# Patient Record
Sex: FEMALE | Race: WHITE | NOT HISPANIC OR LATINO
[De-identification: names, ages, dates, MRNs, and addresses within clinical notes are randomized per-mention and may not be internally consistent; named-entity substitution may affect disease eponyms.]

---

## 2017-06-01 ENCOUNTER — APPOINTMENT (OUTPATIENT)
Dept: CARDIOLOGY | Facility: CLINIC | Age: 67
End: 2017-06-01

## 2017-06-01 ENCOUNTER — NON-APPOINTMENT (OUTPATIENT)
Age: 67
End: 2017-06-01

## 2017-06-01 VITALS
HEIGHT: 61 IN | DIASTOLIC BLOOD PRESSURE: 95 MMHG | HEART RATE: 62 BPM | SYSTOLIC BLOOD PRESSURE: 170 MMHG | WEIGHT: 172 LBS | OXYGEN SATURATION: 95 % | BODY MASS INDEX: 32.47 KG/M2

## 2017-06-01 VITALS — DIASTOLIC BLOOD PRESSURE: 84 MMHG | SYSTOLIC BLOOD PRESSURE: 140 MMHG

## 2017-06-01 DIAGNOSIS — D18.03 HEMANGIOMA OF INTRA-ABDOMINAL STRUCTURES: ICD-10-CM

## 2017-06-01 DIAGNOSIS — Z82.49 FAMILY HISTORY OF ISCHEMIC HEART DISEASE AND OTHER DISEASES OF THE CIRCULATORY SYSTEM: ICD-10-CM

## 2017-06-01 DIAGNOSIS — H81.10 BENIGN PAROXYSMAL VERTIGO, UNSPECIFIED EAR: ICD-10-CM

## 2017-06-01 DIAGNOSIS — E04.1 NONTOXIC SINGLE THYROID NODULE: ICD-10-CM

## 2017-06-01 RX ORDER — MULTIVITAMIN
CAPSULE ORAL
Refills: 0 | Status: ACTIVE | COMMUNITY

## 2017-06-01 RX ORDER — METOPROLOL TARTRATE 25 MG/1
25 TABLET, FILM COATED ORAL TWICE DAILY
Qty: 180 | Refills: 3 | Status: DISCONTINUED | COMMUNITY
End: 2017-06-01

## 2017-06-01 RX ORDER — CETIRIZINE HCL 10 MG
10 TABLET ORAL
Refills: 0 | Status: ACTIVE | COMMUNITY

## 2017-06-05 ENCOUNTER — RESULT REVIEW (OUTPATIENT)
Age: 67
End: 2017-06-05

## 2017-07-19 ENCOUNTER — APPOINTMENT (OUTPATIENT)
Dept: CARDIOLOGY | Facility: CLINIC | Age: 67
End: 2017-07-19

## 2017-07-25 ENCOUNTER — APPOINTMENT (OUTPATIENT)
Dept: CARDIOLOGY | Facility: CLINIC | Age: 67
End: 2017-07-25

## 2017-09-11 ENCOUNTER — NON-APPOINTMENT (OUTPATIENT)
Age: 67
End: 2017-09-11

## 2017-09-11 ENCOUNTER — APPOINTMENT (OUTPATIENT)
Dept: CARDIOLOGY | Facility: CLINIC | Age: 67
End: 2017-09-11
Payer: MEDICARE

## 2017-09-11 VITALS
HEIGHT: 61 IN | HEART RATE: 78 BPM | WEIGHT: 171 LBS | OXYGEN SATURATION: 95 % | BODY MASS INDEX: 32.28 KG/M2 | SYSTOLIC BLOOD PRESSURE: 168 MMHG | DIASTOLIC BLOOD PRESSURE: 91 MMHG

## 2017-09-11 VITALS — DIASTOLIC BLOOD PRESSURE: 90 MMHG | SYSTOLIC BLOOD PRESSURE: 158 MMHG

## 2017-09-11 PROCEDURE — 99215 OFFICE O/P EST HI 40 MIN: CPT

## 2017-09-11 PROCEDURE — 93000 ELECTROCARDIOGRAM COMPLETE: CPT

## 2017-10-18 ENCOUNTER — APPOINTMENT (OUTPATIENT)
Dept: CARDIOLOGY | Facility: CLINIC | Age: 67
End: 2017-10-18
Payer: MEDICARE

## 2017-10-18 VITALS — DIASTOLIC BLOOD PRESSURE: 80 MMHG | SYSTOLIC BLOOD PRESSURE: 148 MMHG

## 2017-10-18 VITALS
OXYGEN SATURATION: 96 % | DIASTOLIC BLOOD PRESSURE: 77 MMHG | HEART RATE: 67 BPM | HEIGHT: 61 IN | SYSTOLIC BLOOD PRESSURE: 185 MMHG | BODY MASS INDEX: 31.53 KG/M2 | WEIGHT: 167 LBS

## 2017-10-18 LAB
ALBUMIN SERPL ELPH-MCNC: 4.5 G/DL
ALP BLD-CCNC: 86 U/L
ALT SERPL-CCNC: 20 U/L
ANION GAP SERPL CALC-SCNC: 14 MMOL/L
AST SERPL-CCNC: 28 U/L
BASOPHILS # BLD AUTO: 0.03 K/UL
BASOPHILS NFR BLD AUTO: 0.4 %
BILIRUB SERPL-MCNC: 0.4 MG/DL
BUN SERPL-MCNC: 15 MG/DL
CALCIUM SERPL-MCNC: 10.1 MG/DL
CHLORIDE SERPL-SCNC: 103 MMOL/L
CHOLEST SERPL-MCNC: 177 MG/DL
CHOLEST/HDLC SERPL: 3.2 RATIO
CO2 SERPL-SCNC: 25 MMOL/L
CREAT SERPL-MCNC: 0.79 MG/DL
EOSINOPHIL # BLD AUTO: 0.14 K/UL
EOSINOPHIL NFR BLD AUTO: 2.1 %
GLUCOSE SERPL-MCNC: 96 MG/DL
HCT VFR BLD CALC: 42.3 %
HDLC SERPL-MCNC: 56 MG/DL
HGB BLD-MCNC: 13.7 G/DL
IMM GRANULOCYTES NFR BLD AUTO: 0.1 %
LDLC SERPL CALC-MCNC: 89 MG/DL
LYMPHOCYTES # BLD AUTO: 2.35 K/UL
LYMPHOCYTES NFR BLD AUTO: 34.7 %
MAN DIFF?: NORMAL
MCHC RBC-ENTMCNC: 28.5 PG
MCHC RBC-ENTMCNC: 32.4 GM/DL
MCV RBC AUTO: 88.1 FL
MONOCYTES # BLD AUTO: 0.46 K/UL
MONOCYTES NFR BLD AUTO: 6.8 %
NEUTROPHILS # BLD AUTO: 3.79 K/UL
NEUTROPHILS NFR BLD AUTO: 55.9 %
PLATELET # BLD AUTO: 243 K/UL
POTASSIUM SERPL-SCNC: 4.9 MMOL/L
PROT SERPL-MCNC: 7.6 G/DL
RBC # BLD: 4.8 M/UL
RBC # FLD: 14.1 %
SODIUM SERPL-SCNC: 142 MMOL/L
TRIGL SERPL-MCNC: 158 MG/DL
WBC # FLD AUTO: 6.78 K/UL

## 2017-10-18 PROCEDURE — 99215 OFFICE O/P EST HI 40 MIN: CPT

## 2017-10-21 LAB — 25(OH)D3 SERPL-MCNC: 41.1 NG/ML

## 2017-11-04 ENCOUNTER — APPOINTMENT (OUTPATIENT)
Dept: INTERNAL MEDICINE | Facility: CLINIC | Age: 67
End: 2017-11-04
Payer: MEDICARE

## 2017-11-04 VITALS
OXYGEN SATURATION: 97 % | WEIGHT: 170 LBS | SYSTOLIC BLOOD PRESSURE: 132 MMHG | HEART RATE: 80 BPM | RESPIRATION RATE: 14 BRPM | HEIGHT: 61 IN | TEMPERATURE: 98 F | BODY MASS INDEX: 32.1 KG/M2 | DIASTOLIC BLOOD PRESSURE: 80 MMHG

## 2017-11-04 DIAGNOSIS — Z23 ENCOUNTER FOR IMMUNIZATION: ICD-10-CM

## 2017-11-04 DIAGNOSIS — Z86.39 PERSONAL HISTORY OF OTHER ENDOCRINE, NUTRITIONAL AND METABOLIC DISEASE: ICD-10-CM

## 2017-11-04 DIAGNOSIS — Z78.9 OTHER SPECIFIED HEALTH STATUS: ICD-10-CM

## 2017-11-04 PROCEDURE — 99204 OFFICE O/P NEW MOD 45 MIN: CPT

## 2017-11-04 RX ORDER — PREDNISONE 20 MG/1
20 TABLET ORAL
Qty: 14 | Refills: 0 | Status: DISCONTINUED | COMMUNITY
Start: 2017-08-24 | End: 2017-11-04

## 2017-11-04 RX ORDER — FLUTICASONE PROPIONATE 50 MCG
50 SPRAY, SUSPENSION NASAL
Refills: 0 | Status: DISCONTINUED | COMMUNITY
End: 2017-11-04

## 2017-11-04 RX ORDER — BACLOFEN 10 MG/1
10 TABLET ORAL
Qty: 20 | Refills: 0 | Status: DISCONTINUED | COMMUNITY
Start: 2017-08-24

## 2017-11-04 RX ORDER — TRAMADOL HYDROCHLORIDE 50 MG/1
50 TABLET, COATED ORAL
Qty: 20 | Refills: 0 | Status: DISCONTINUED | COMMUNITY
Start: 2017-08-25 | End: 2017-11-04

## 2017-11-06 ENCOUNTER — MED ADMIN CHARGE (OUTPATIENT)
Age: 67
End: 2017-11-06

## 2017-11-06 LAB
HCV AB SER QL: NONREACTIVE
HCV S/CO RATIO: 0.22 S/CO

## 2017-11-27 ENCOUNTER — APPOINTMENT (OUTPATIENT)
Dept: CARDIOLOGY | Facility: CLINIC | Age: 67
End: 2017-11-27
Payer: MEDICARE

## 2017-11-27 ENCOUNTER — APPOINTMENT (OUTPATIENT)
Dept: INTERNAL MEDICINE | Facility: CLINIC | Age: 67
End: 2017-11-27
Payer: MEDICARE

## 2017-11-27 VITALS — DIASTOLIC BLOOD PRESSURE: 80 MMHG | SYSTOLIC BLOOD PRESSURE: 150 MMHG

## 2017-11-27 VITALS
WEIGHT: 169 LBS | BODY MASS INDEX: 31.91 KG/M2 | OXYGEN SATURATION: 96 % | SYSTOLIC BLOOD PRESSURE: 165 MMHG | DIASTOLIC BLOOD PRESSURE: 82 MMHG | HEART RATE: 70 BPM | HEIGHT: 61 IN

## 2017-11-27 PROCEDURE — 99215 OFFICE O/P EST HI 40 MIN: CPT

## 2017-11-27 PROCEDURE — 90736 HZV VACCINE LIVE SUBQ: CPT

## 2017-11-27 PROCEDURE — 90471 IMMUNIZATION ADMIN: CPT

## 2018-01-16 ENCOUNTER — APPOINTMENT (OUTPATIENT)
Dept: CARDIOLOGY | Facility: CLINIC | Age: 68
End: 2018-01-16
Payer: MEDICARE

## 2018-01-16 VITALS
HEIGHT: 61 IN | SYSTOLIC BLOOD PRESSURE: 163 MMHG | WEIGHT: 174 LBS | OXYGEN SATURATION: 94 % | DIASTOLIC BLOOD PRESSURE: 94 MMHG | HEART RATE: 70 BPM | BODY MASS INDEX: 32.85 KG/M2

## 2018-01-16 VITALS — DIASTOLIC BLOOD PRESSURE: 80 MMHG | SYSTOLIC BLOOD PRESSURE: 160 MMHG

## 2018-01-16 PROCEDURE — 99215 OFFICE O/P EST HI 40 MIN: CPT

## 2018-04-10 ENCOUNTER — APPOINTMENT (OUTPATIENT)
Dept: CARDIOLOGY | Facility: CLINIC | Age: 68
End: 2018-04-10
Payer: MEDICARE

## 2018-04-10 VITALS
OXYGEN SATURATION: 93 % | SYSTOLIC BLOOD PRESSURE: 157 MMHG | WEIGHT: 181 LBS | HEIGHT: 61 IN | DIASTOLIC BLOOD PRESSURE: 85 MMHG | BODY MASS INDEX: 34.17 KG/M2 | HEART RATE: 70 BPM

## 2018-04-10 VITALS — DIASTOLIC BLOOD PRESSURE: 80 MMHG | SYSTOLIC BLOOD PRESSURE: 120 MMHG

## 2018-04-10 PROCEDURE — 99215 OFFICE O/P EST HI 40 MIN: CPT

## 2018-05-31 ENCOUNTER — NON-APPOINTMENT (OUTPATIENT)
Age: 68
End: 2018-05-31

## 2018-05-31 ENCOUNTER — APPOINTMENT (OUTPATIENT)
Dept: INTERNAL MEDICINE | Facility: CLINIC | Age: 68
End: 2018-05-31
Payer: MEDICARE

## 2018-05-31 VITALS
DIASTOLIC BLOOD PRESSURE: 70 MMHG | SYSTOLIC BLOOD PRESSURE: 110 MMHG | RESPIRATION RATE: 14 BRPM | HEART RATE: 75 BPM | TEMPERATURE: 98.5 F | OXYGEN SATURATION: 96 % | BODY MASS INDEX: 34.55 KG/M2 | WEIGHT: 183 LBS | HEIGHT: 61 IN

## 2018-05-31 DIAGNOSIS — J30.2 OTHER SEASONAL ALLERGIC RHINITIS: ICD-10-CM

## 2018-05-31 DIAGNOSIS — R21 RASH AND OTHER NONSPECIFIC SKIN ERUPTION: ICD-10-CM

## 2018-05-31 PROCEDURE — G0439: CPT

## 2018-05-31 PROCEDURE — 93000 ELECTROCARDIOGRAM COMPLETE: CPT

## 2018-05-31 RX ORDER — MECLIZINE HYDROCHLORIDE 25 MG/1
25 TABLET ORAL
Qty: 30 | Refills: 0 | Status: DISCONTINUED | COMMUNITY
Start: 2017-05-15 | End: 2018-05-31

## 2018-05-31 NOTE — HISTORY OF PRESENT ILLNESS
[de-identified] : Pt followed by urology for frequent blood in urine\par Followed by endocrine for underactive thyroid and thyroid nodules.\par Followed by card for blood pressure and high cholesterol.

## 2018-05-31 NOTE — HEALTH RISK ASSESSMENT
[Very Good] : ~his/her~  mood as very good [Patient reported mammogram was normal] : Patient reported mammogram was normal [Patient reported colonoscopy was normal] : Patient reported colonoscopy was normal [0] : 2) Feeling down, depressed, or hopeless: Not at all (0) [MammogramDate] : 06/17 [BoneDensityDate] : 12/17 [BoneDensityComments] : osteopenia [ColonoscopyDate] : 03/16

## 2018-05-31 NOTE — PHYSICAL EXAM

## 2018-05-31 NOTE — ASSESSMENT
[FreeTextEntry1] : HTN- good control\par \par Hyperlipidemia- check lipid today\par \par Hematuria- follow up with urology\par \par Thyroid- follow up with endo\par \par Allergies- add Astelin NS\par \par Discussed diet and weight loss- referral to nutrition.

## 2018-06-01 LAB
25(OH)D3 SERPL-MCNC: 38.8 NG/ML
ALBUMIN SERPL ELPH-MCNC: 4.4 G/DL
ALP BLD-CCNC: 81 U/L
ALT SERPL-CCNC: 25 U/L
ANION GAP SERPL CALC-SCNC: 17 MMOL/L
AST SERPL-CCNC: 27 U/L
BASOPHILS # BLD AUTO: 0.03 K/UL
BASOPHILS NFR BLD AUTO: 0.4 %
BILIRUB SERPL-MCNC: 0.3 MG/DL
BUN SERPL-MCNC: 20 MG/DL
CALCIUM SERPL-MCNC: 9.7 MG/DL
CHLORIDE SERPL-SCNC: 101 MMOL/L
CHOLEST SERPL-MCNC: 163 MG/DL
CHOLEST/HDLC SERPL: 3.5 RATIO
CO2 SERPL-SCNC: 25 MMOL/L
CREAT SERPL-MCNC: 0.89 MG/DL
EOSINOPHIL # BLD AUTO: 0.14 K/UL
EOSINOPHIL NFR BLD AUTO: 2 %
FOLATE SERPL-MCNC: >20 NG/ML
GLUCOSE SERPL-MCNC: 108 MG/DL
HBA1C MFR BLD HPLC: 5.8 %
HCT VFR BLD CALC: 41.8 %
HDLC SERPL-MCNC: 46 MG/DL
HGB BLD-MCNC: 13.8 G/DL
IMM GRANULOCYTES NFR BLD AUTO: 0.3 %
LDLC SERPL CALC-MCNC: 68 MG/DL
LYMPHOCYTES # BLD AUTO: 2.25 K/UL
LYMPHOCYTES NFR BLD AUTO: 32.1 %
MAN DIFF?: NORMAL
MCHC RBC-ENTMCNC: 29 PG
MCHC RBC-ENTMCNC: 33 GM/DL
MCV RBC AUTO: 87.8 FL
MONOCYTES # BLD AUTO: 0.4 K/UL
MONOCYTES NFR BLD AUTO: 5.7 %
NEUTROPHILS # BLD AUTO: 4.18 K/UL
NEUTROPHILS NFR BLD AUTO: 59.5 %
PLATELET # BLD AUTO: 233 K/UL
POTASSIUM SERPL-SCNC: 4.1 MMOL/L
PROT SERPL-MCNC: 7.7 G/DL
RBC # BLD: 4.76 M/UL
RBC # FLD: 13.9 %
SODIUM SERPL-SCNC: 143 MMOL/L
TRIGL SERPL-MCNC: 246 MG/DL
TSH SERPL-ACNC: 2.77 UIU/ML
VIT B12 SERPL-MCNC: 768 PG/ML
WBC # FLD AUTO: 7.02 K/UL

## 2018-06-05 ENCOUNTER — RESULT REVIEW (OUTPATIENT)
Age: 68
End: 2018-06-05

## 2018-06-27 ENCOUNTER — RX RENEWAL (OUTPATIENT)
Age: 68
End: 2018-06-27

## 2018-07-26 PROBLEM — J30.2 SEASONAL ALLERGIES: Status: ACTIVE | Noted: 2017-06-01

## 2018-08-28 ENCOUNTER — APPOINTMENT (OUTPATIENT)
Dept: INTERNAL MEDICINE | Facility: CLINIC | Age: 68
End: 2018-08-28
Payer: MEDICARE

## 2018-08-28 VITALS
WEIGHT: 176 LBS | DIASTOLIC BLOOD PRESSURE: 70 MMHG | HEART RATE: 65 BPM | HEIGHT: 61 IN | SYSTOLIC BLOOD PRESSURE: 120 MMHG | TEMPERATURE: 98.2 F | RESPIRATION RATE: 14 BRPM | OXYGEN SATURATION: 97 % | BODY MASS INDEX: 33.23 KG/M2

## 2018-08-28 PROCEDURE — 99214 OFFICE O/P EST MOD 30 MIN: CPT

## 2018-08-28 NOTE — HISTORY OF PRESENT ILLNESS
[de-identified] : pt here today for follow up.\par Pt had fall 3 weeks ago. Pt fell forward and hit face. Pt had a swollen lip and now much better.\par Today feels well. \par Pt with HTN- stable on current Meds\par Pt with hyperlipidemia- good control.

## 2018-08-29 ENCOUNTER — NON-APPOINTMENT (OUTPATIENT)
Age: 68
End: 2018-08-29

## 2018-08-29 ENCOUNTER — APPOINTMENT (OUTPATIENT)
Dept: CARDIOLOGY | Facility: CLINIC | Age: 68
End: 2018-08-29
Payer: MEDICARE

## 2018-08-29 VITALS
SYSTOLIC BLOOD PRESSURE: 128 MMHG | WEIGHT: 175 LBS | HEART RATE: 70 BPM | OXYGEN SATURATION: 96 % | DIASTOLIC BLOOD PRESSURE: 78 MMHG | BODY MASS INDEX: 33.04 KG/M2 | HEIGHT: 61 IN

## 2018-08-29 DIAGNOSIS — R55 SYNCOPE AND COLLAPSE: ICD-10-CM

## 2018-08-29 PROCEDURE — 99215 OFFICE O/P EST HI 40 MIN: CPT

## 2018-08-29 PROCEDURE — 93000 ELECTROCARDIOGRAM COMPLETE: CPT

## 2018-08-29 RX ORDER — UBIDECARENONE 100 MG
CAPSULE ORAL
Refills: 0 | Status: ACTIVE | COMMUNITY

## 2018-10-25 ENCOUNTER — APPOINTMENT (OUTPATIENT)
Dept: INTERNAL MEDICINE | Facility: CLINIC | Age: 68
End: 2018-10-25
Payer: MEDICARE

## 2018-10-25 VITALS
HEART RATE: 78 BPM | DIASTOLIC BLOOD PRESSURE: 80 MMHG | BODY MASS INDEX: 32.85 KG/M2 | TEMPERATURE: 98.4 F | WEIGHT: 174 LBS | HEIGHT: 61 IN | RESPIRATION RATE: 14 BRPM | SYSTOLIC BLOOD PRESSURE: 130 MMHG | OXYGEN SATURATION: 96 %

## 2018-10-25 DIAGNOSIS — J30.9 ALLERGIC RHINITIS, UNSPECIFIED: ICD-10-CM

## 2018-10-25 PROCEDURE — 99214 OFFICE O/P EST MOD 30 MIN: CPT

## 2018-10-25 NOTE — HISTORY OF PRESENT ILLNESS
[de-identified] : pt here today for blood pressure check and cholesterol check.\par Complains of sore throat and congestion.\par No temps.\par No sinus pressure\par SLight PN

## 2018-10-25 NOTE — ASSESSMENT
[FreeTextEntry1] : viral URI- supportive for now.\par HTN- good control\par Hyperlipidemia- check lipid panel\par Hypothyroidism- check thyroid today\par Allergic rhinitis- Astelin ns bid\par CHeck labs

## 2018-10-25 NOTE — REVIEW OF SYSTEMS
[Nasal Discharge] : nasal discharge [Sore Throat] : sore throat [Postnasal Drip] : postnasal drip [Negative] : Heme/Lymph

## 2018-10-26 LAB
ALBUMIN SERPL ELPH-MCNC: 4.4 G/DL
ALP BLD-CCNC: 78 U/L
ALT SERPL-CCNC: 17 U/L
ANION GAP SERPL CALC-SCNC: 14 MMOL/L
AST SERPL-CCNC: 22 U/L
BILIRUB SERPL-MCNC: 0.4 MG/DL
BUN SERPL-MCNC: 15 MG/DL
CALCIUM SERPL-MCNC: 10 MG/DL
CHLORIDE SERPL-SCNC: 101 MMOL/L
CHOLEST SERPL-MCNC: 170 MG/DL
CHOLEST/HDLC SERPL: 3.3 RATIO
CO2 SERPL-SCNC: 26 MMOL/L
CREAT SERPL-MCNC: 0.83 MG/DL
GLUCOSE SERPL-MCNC: 96 MG/DL
HDLC SERPL-MCNC: 51 MG/DL
LDLC SERPL CALC-MCNC: 89 MG/DL
POTASSIUM SERPL-SCNC: 4.4 MMOL/L
PROT SERPL-MCNC: 7.6 G/DL
SODIUM SERPL-SCNC: 141 MMOL/L
TRIGL SERPL-MCNC: 151 MG/DL

## 2018-10-29 ENCOUNTER — APPOINTMENT (OUTPATIENT)
Dept: CARDIOLOGY | Facility: CLINIC | Age: 68
End: 2018-10-29
Payer: MEDICARE

## 2018-10-29 VITALS
DIASTOLIC BLOOD PRESSURE: 91 MMHG | BODY MASS INDEX: 32.85 KG/M2 | SYSTOLIC BLOOD PRESSURE: 170 MMHG | WEIGHT: 174 LBS | OXYGEN SATURATION: 95 % | HEIGHT: 61 IN | HEART RATE: 62 BPM

## 2018-10-29 VITALS — SYSTOLIC BLOOD PRESSURE: 130 MMHG | DIASTOLIC BLOOD PRESSURE: 78 MMHG

## 2018-10-29 PROCEDURE — 99215 OFFICE O/P EST HI 40 MIN: CPT

## 2018-11-21 ENCOUNTER — RX RENEWAL (OUTPATIENT)
Age: 68
End: 2018-11-21

## 2019-01-14 ENCOUNTER — RX RENEWAL (OUTPATIENT)
Age: 69
End: 2019-01-14

## 2019-04-08 ENCOUNTER — RX RENEWAL (OUTPATIENT)
Age: 69
End: 2019-04-08

## 2019-04-09 RX ORDER — OLMESARTAN MEDOXOMIL 40 MG/1
40 TABLET, FILM COATED ORAL
Qty: 90 | Refills: 3 | Status: DISCONTINUED | COMMUNITY
Start: 2017-10-18 | End: 2019-04-09

## 2019-05-22 ENCOUNTER — APPOINTMENT (OUTPATIENT)
Dept: CARDIOLOGY | Facility: CLINIC | Age: 69
End: 2019-05-22
Payer: MEDICARE

## 2019-05-22 ENCOUNTER — NON-APPOINTMENT (OUTPATIENT)
Age: 69
End: 2019-05-22

## 2019-05-22 VITALS
OXYGEN SATURATION: 96 % | DIASTOLIC BLOOD PRESSURE: 81 MMHG | HEIGHT: 61 IN | WEIGHT: 178 LBS | BODY MASS INDEX: 33.61 KG/M2 | SYSTOLIC BLOOD PRESSURE: 149 MMHG | HEART RATE: 59 BPM

## 2019-05-22 PROCEDURE — 93000 ELECTROCARDIOGRAM COMPLETE: CPT

## 2019-05-22 PROCEDURE — 99215 OFFICE O/P EST HI 40 MIN: CPT

## 2019-05-22 NOTE — HISTORY OF PRESENT ILLNESS
[FreeTextEntry1] : Ms. Vita Toro presented to the office today for follow-up cardiac evaluation.\par \par The patient is a 68-year-old female with a history of hypertension, a dyslipidemia, pre-diabetes, mild mitral regurgitation, mild carotid atherosclerosis, vertigo, hypothyroidism, a left thyroid nodule, hematuria, and seasonal allergies. I last evaluated the patient in the office on 10/29/18.\par \par At the time of a previous visit here on 1/16/18, I added HCTZ 12.5 mg daily, in an effort to more optimally control her blood pressure. She experienced an apparent syncopal episode on 8/3/18, and at the time of a follow-up visit here on 8/29/18, I instructed her to reduce the dosage add HCTZ to 12.5 mg every other day. She has not experienced recurrence of syncope since her previous visit here. She has been monitoring her blood pressure readings at home, and describes the readings as having been predominantly running within the normal range. More recently, Benicar 40 mg daily he was switched to Diovan 320 mg daily, secondary to the Benicar being presently back-ordered.\par \par The patient has been doing well from a cardiac symptomatic standpoint since her previous visit here on 10/29/18. Specifically, she does not describe having experienced chest discomfort or dyspnea on exertion in association with her activities. She has not noted orthopnea, paroxysmal nocturnal dyspnea, or lower extremity edema. She has not experienced any episodes of palpitations, presyncope or syncope.\par \par Review of systems is significant for the patient having developed lower back discomfort in April of 2019. She underwent an MRI evaluation on 5/8/19, which she states revealed evidence for a lumbar disc herniation. She consulted with an orthopedist on 5/16/19, and has been treated thus far with tapering courses of steroids. She has consulted with an endocrinologist regarding surveillance of a left-sided thyroid nodule and management of hypothyroidism.\par \par Laboratory studies performed on 10/25/18 revealed cholesterol 170, triglycerides 151, HDL 51, and calculated LDL 89. The liver chemistries were normal. The BUN and creatinine were 15 and 0.83, respectively. The potassium level was 4.4. The glucose level was 96.\par \par Previous History:\par \par On 8/3/18, the patient was walking in a store, when she suddenly felt herself falling. She had an apparent brief syncopal episode, noting that she does not specifically recall hitting the floor, however, she did find herself on the floor, having injured her lip. She stayed up immediately, and was asymptomatic. She did not suffer any other significant injuries in association with this episode. She does not recall having experienced any similar previous episodes, nor has she experienced any recurrent episodes.\par \par As far as risk factors for coronary artery disease are concerned, the patient has a history of hypertension and a dyslipidemia, for which she is being treated with medical therapy. She denies a history of diabetes. She denies a history of cigarette smoking. She describes having an immediate family history of premature coronary artery disease, stating that her father suffered "heart attack" at the age of 55.\par \par Echocardiography performed on 7/19/17 revealed normal cardiac chamber sizes with normal left ventricular wall thickness and wall motion. Left ventricular systolic function was normal, with an estimated ejection fraction of 70%. Minimal aortic regurgitation and mild mitral regurgitation were demonstrated. There was no evidence of pulmonary hypertension.\par \par Carotid artery Doppler testing performed on 7/25/17 revealed mild plaque formation involving the bulbar regions bilaterally. No significant stenoses were demonstrated. As an incidental finding, a 1.5 cm x 1.3 cm left thyroid nodule was detected.\par \par Past medical/surgical history according to the patient is significant for hypothyroidism, seasonal allergies, vertigo, a benign left thyroid nodule, a liver hemangioma, arthroscopic right knee surgery, bilateral cataract surgeries, and resection of a benign left breast cyst. The patient denies ever having been pregnant.

## 2019-05-22 NOTE — DISCUSSION/SUMMARY
[FreeTextEntry1] : Ms. Toro has a history of a dyslipidemia and hypertension. I suspect that the falling episode that she experienced on 8/3/18 actually represented a brief syncopal episode, possibly related to hypotension, possibly related to thiazide diuretic therapy having been added to her antihypertensive medication regimen. She does not report having experienced any recurrent similar episodes since the dosage of the HCTZ was reduced to 12.5 mg every other day at the time of a follow-up visit here on 8/29/18. She has been stable from a cardiac perspective since her previous visit here on 10/29/18. Specifically, she does not describe having experienced any signs or symptoms to suggest the development of an anginal syndrome or congestive heart failure. Her cardiac examination today is unremarkable. Her blood pressure reading was elevated upon initial presentation to the office today, however, a follow-up measurement obtained after her examination revealed the reading to be satisfactory. Her electrocardiogram today reveals sinus rhythm with voltage criteria for left ventricular hypertrophy, non-specific poor R wave progression in leads V4-V6, and mild non-specific repolarization changes, essentially unchanged from her previous office tracing.\par \par As her follow-up blood pressure reading today is satisfactory, as well as the fact that her home readings have been running predominantly in the normal range since her previous visit here, I have instructed the patient to continue her antihypertensive medications as presently prescribed for the time being.\par \par I have reviewed the findings of blood test report of 10/25/18 in detail with the patient today, and I have instructed her to continue on the present dose of Crestor for the time being. She anticipates having follow-up blood testing performed in June of 2019 to the office of Dr. Collins for reassessment.\par \par The importance of proper dietary habits, weight loss, and continued regular exercise was again emphasized to the patient today. \par \par I have reviewed the findings of the echocardiogram 7/19/17 and the carotid artery Doppler study of 7/25/17 in detail with the patient today.\par \par The patient will be following up with her endocrinologist regarding surveillance of her left thyroid nodule.\par \par I have asked the patient to call me if she should have any questions or problems pertaining to these matters, and especially if she should experience suspected recurrence of syncope, or any concerning symptoms. I have otherwise asked her to return to the office for follow-up cardiac evaluation and blood pressure reassessment in 6 months, provided she remains clinically stable in the interim.

## 2019-06-03 ENCOUNTER — RESULT REVIEW (OUTPATIENT)
Age: 69
End: 2019-06-03

## 2019-06-04 ENCOUNTER — APPOINTMENT (OUTPATIENT)
Dept: INTERNAL MEDICINE | Facility: CLINIC | Age: 69
End: 2019-06-04
Payer: MEDICARE

## 2019-06-04 ENCOUNTER — CHART COPY (OUTPATIENT)
Age: 69
End: 2019-06-04

## 2019-06-04 VITALS — SYSTOLIC BLOOD PRESSURE: 120 MMHG | DIASTOLIC BLOOD PRESSURE: 78 MMHG

## 2019-06-04 VITALS
OXYGEN SATURATION: 98 % | HEART RATE: 86 BPM | BODY MASS INDEX: 33.23 KG/M2 | DIASTOLIC BLOOD PRESSURE: 80 MMHG | SYSTOLIC BLOOD PRESSURE: 140 MMHG | WEIGHT: 176 LBS | TEMPERATURE: 98.3 F | RESPIRATION RATE: 14 BRPM | HEIGHT: 61 IN

## 2019-06-04 DIAGNOSIS — R42 DIZZINESS AND GIDDINESS: ICD-10-CM

## 2019-06-04 DIAGNOSIS — Z23 ENCOUNTER FOR IMMUNIZATION: ICD-10-CM

## 2019-06-04 PROCEDURE — G0439: CPT

## 2019-06-04 PROCEDURE — 90715 TDAP VACCINE 7 YRS/> IM: CPT

## 2019-06-04 PROCEDURE — 90471 IMMUNIZATION ADMIN: CPT

## 2019-06-04 PROCEDURE — 36415 COLL VENOUS BLD VENIPUNCTURE: CPT

## 2019-06-04 NOTE — HEALTH RISK ASSESSMENT
[0] : 2) Feeling down, depressed, or hopeless: Not at all (0) [Patient reported mammogram was normal] : Patient reported mammogram was normal [MammogramDate] : 06/18 [ColonoscopyDate] : 03/16

## 2019-06-04 NOTE — HISTORY OF PRESENT ILLNESS
[de-identified] : Pt now followed by spine specialist for herniated disc. Pt undergoing PT Pt doing better.\par Pt with renal cyst- followed by renal. Pt with moderate blood in urine ( March 2019)\par Pt followed by endocrine for thyroid\par Pt followed by cardiology for blood pressure and high cholesterol

## 2019-06-05 LAB
25(OH)D3 SERPL-MCNC: 38.7 NG/ML
ALBUMIN SERPL ELPH-MCNC: 4.7 G/DL
ALP BLD-CCNC: 88 U/L
ALT SERPL-CCNC: 22 U/L
ANION GAP SERPL CALC-SCNC: 15 MMOL/L
APPEARANCE: CLEAR
AST SERPL-CCNC: 17 U/L
BACTERIA: NEGATIVE
BASOPHILS # BLD AUTO: 0.05 K/UL
BASOPHILS NFR BLD AUTO: 0.8 %
BILIRUB SERPL-MCNC: 0.3 MG/DL
BILIRUBIN URINE: NEGATIVE
BLOOD URINE: ABNORMAL
BUN SERPL-MCNC: 18 MG/DL
CALCIUM SERPL-MCNC: 10.1 MG/DL
CHLORIDE SERPL-SCNC: 104 MMOL/L
CHOLEST SERPL-MCNC: 173 MG/DL
CHOLEST/HDLC SERPL: 3.3 RATIO
CO2 SERPL-SCNC: 23 MMOL/L
COLOR: COLORLESS
CREAT SERPL-MCNC: 0.77 MG/DL
EOSINOPHIL # BLD AUTO: 0.15 K/UL
EOSINOPHIL NFR BLD AUTO: 2.3 %
ESTIMATED AVERAGE GLUCOSE: 123 MG/DL
FOLATE SERPL-MCNC: >20 NG/ML
GLUCOSE QUALITATIVE U: NEGATIVE
GLUCOSE SERPL-MCNC: 107 MG/DL
HBA1C MFR BLD HPLC: 5.9 %
HCT VFR BLD CALC: 42 %
HDLC SERPL-MCNC: 52 MG/DL
HGB BLD-MCNC: 13.7 G/DL
HYALINE CASTS: 1 /LPF
IMM GRANULOCYTES NFR BLD AUTO: 0.3 %
KETONES URINE: NEGATIVE
LDLC SERPL CALC-MCNC: 91 MG/DL
LEUKOCYTE ESTERASE URINE: NEGATIVE
LYMPHOCYTES # BLD AUTO: 2.5 K/UL
LYMPHOCYTES NFR BLD AUTO: 37.5 %
MAN DIFF?: NORMAL
MCHC RBC-ENTMCNC: 29 PG
MCHC RBC-ENTMCNC: 32.6 GM/DL
MCV RBC AUTO: 89 FL
MICROSCOPIC-UA: NORMAL
MONOCYTES # BLD AUTO: 0.51 K/UL
MONOCYTES NFR BLD AUTO: 7.7 %
NEUTROPHILS # BLD AUTO: 3.43 K/UL
NEUTROPHILS NFR BLD AUTO: 51.4 %
NITRITE URINE: NEGATIVE
PH URINE: 5
PLATELET # BLD AUTO: 238 K/UL
POTASSIUM SERPL-SCNC: 4.3 MMOL/L
PROT SERPL-MCNC: 7.2 G/DL
PROTEIN URINE: NEGATIVE
RBC # BLD: 4.72 M/UL
RBC # FLD: 13.8 %
RED BLOOD CELLS URINE: 2 /HPF
SODIUM SERPL-SCNC: 142 MMOL/L
SPECIFIC GRAVITY URINE: 1.01
SQUAMOUS EPITHELIAL CELLS: 1 /HPF
TRIGL SERPL-MCNC: 150 MG/DL
TSH SERPL-ACNC: 1.81 UIU/ML
UROBILINOGEN URINE: NORMAL
VIT B12 SERPL-MCNC: 737 PG/ML
WBC # FLD AUTO: 6.66 K/UL
WHITE BLOOD CELLS URINE: 1 /HPF

## 2019-06-20 LAB — HEMOCCULT STL QL IA: NEGATIVE

## 2019-07-03 ENCOUNTER — RX RENEWAL (OUTPATIENT)
Age: 69
End: 2019-07-03

## 2019-07-29 ENCOUNTER — RX RENEWAL (OUTPATIENT)
Age: 69
End: 2019-07-29

## 2019-11-20 ENCOUNTER — NON-APPOINTMENT (OUTPATIENT)
Age: 69
End: 2019-11-20

## 2019-11-20 ENCOUNTER — APPOINTMENT (OUTPATIENT)
Dept: CARDIOLOGY | Facility: CLINIC | Age: 69
End: 2019-11-20
Payer: MEDICARE

## 2019-11-20 VITALS
HEIGHT: 61 IN | BODY MASS INDEX: 33.23 KG/M2 | SYSTOLIC BLOOD PRESSURE: 146 MMHG | OXYGEN SATURATION: 94 % | HEART RATE: 74 BPM | DIASTOLIC BLOOD PRESSURE: 84 MMHG | WEIGHT: 176 LBS

## 2019-11-20 PROCEDURE — 93000 ELECTROCARDIOGRAM COMPLETE: CPT

## 2019-11-20 PROCEDURE — 99215 OFFICE O/P EST HI 40 MIN: CPT

## 2019-11-20 RX ORDER — KETOCONAZOLE 20 MG/G
2 CREAM TOPICAL TWICE DAILY
Qty: 1 | Refills: 3 | Status: DISCONTINUED | COMMUNITY
Start: 2018-05-31 | End: 2019-11-20

## 2019-11-20 RX ORDER — AZELASTINE HYDROCHLORIDE 137 UG/1
0.1 SPRAY, METERED NASAL TWICE DAILY
Qty: 1 | Refills: 0 | Status: DISCONTINUED | COMMUNITY
Start: 2018-05-31 | End: 2019-11-20

## 2019-11-20 RX ORDER — LEVOTHYROXINE SODIUM 88 UG/1
88 TABLET ORAL DAILY
Refills: 0 | Status: ACTIVE | COMMUNITY

## 2019-11-20 NOTE — HISTORY OF PRESENT ILLNESS
[FreeTextEntry1] : Ms. Vita Toro presented to the office today for follow-up cardiac evaluation.\par \par The patient is a 69-year-old female with a history of hypertension, a dyslipidemia, pre-diabetes, mild mitral regurgitation, mild carotid atherosclerosis, a lumbar disc herniation, vertigo, hypothyroidism, a renal cyst, a left thyroid nodule, hematuria, and seasonal allergies. I last evaluated the patient in the office on 5/22/19.\par \par At the time of a previous visit here on 1/16/18, I added HCTZ 12.5 mg daily, in an effort to more optimally control the patient"s blood pressure. She experienced an apparent syncopal episode on 8/3/18, and at the time of a follow-up visit here on 8/29/18, I instructed her to reduce the dosage add HCTZ to 12.5 mg every other day. She has not experienced recurrence of syncope since that episode.\par \par The patient has been doing well from a cardiac symptomatic standpoint since her previous visit here on 5/22/19. Specifically, she does not describe having experienced chest discomfort or dyspnea on exertion in association with her activities. She has not noted orthopnea, paroxysmal nocturnal dyspnea, or lower extremity edema. She has not experienced any episodes of palpitations, presyncope or syncope.\par \par Review of systems is significant for the patient having developed lower back discomfort in April of 2019. She underwent an MRI evaluation on 5/8/19, which she states revealed evidence for a lumbar disc herniation. She consulted with an orthopedist on 5/16/19, and had been treated thus far with tapering courses of steroids. She received 2 epidural injections, one last month, and more recently approximately 2 weeks ago.\par \par Laboratory studies performed on 6/4/19 revealed cholesterol 173, triglycerides 150, HDL 52, and calculated LDL 91. The liver chemistries were normal. The BUN and creatinine were 18 and 0.77, respectively. The potassium level was 4.3. The glucose level was 107 and the hemoglobin A1c level was 5.9%. The hemoglobin and hematocrit were 13.7 and 42.0, respectively. The TSH level was 1.81. The vitamin D level was 38.7.\par \par Previous History:\par \par On 8/3/18, the patient was walking in a store, when she suddenly felt herself falling. She had an apparent brief syncopal episode, noting that she does not specifically recall hitting the floor, however, she did find herself on the floor, having injured her lip. She stayed up immediately, and was asymptomatic. She did not suffer any other significant injuries in association with this episode. She does not recall having experienced any similar previous episodes, nor has she experienced any recurrent episodes.\par \par As far as risk factors for coronary artery disease are concerned, the patient has a history of hypertension and a dyslipidemia, for which she is being treated with medical therapy. She denies a history of diabetes. She denies a history of cigarette smoking. She describes having an immediate family history of premature coronary artery disease, stating that her father suffered "heart attack" at the age of 55.\par \par Echocardiography performed on 7/19/17 revealed normal cardiac chamber sizes with normal left ventricular wall thickness and wall motion. Left ventricular systolic function was normal, with an estimated ejection fraction of 70%. Minimal aortic regurgitation and mild mitral regurgitation were demonstrated. There was no evidence of pulmonary hypertension.\par \par Carotid artery Doppler testing performed on 7/25/17 revealed mild plaque formation involving the bulbar regions bilaterally. No significant stenoses were demonstrated. As an incidental finding, a 1.5 cm x 1.3 cm left thyroid nodule was detected.\par \par Past medical/surgical history according to the patient is significant for hypothyroidism, seasonal allergies, vertigo, a benign left thyroid nodule, a liver hemangioma, arthroscopic right knee surgery, bilateral cataract surgeries, and resection of a benign left breast cyst. The patient denies ever having been pregnant.

## 2019-11-20 NOTE — DISCUSSION/SUMMARY
[FreeTextEntry1] : Ms. Toro has a history of a dyslipidemia and hypertension. I suspect that the falling episode that she experienced on 8/3/18 actually represented a brief syncopal episode, possibly related to hypotension, possibly related to thiazide diuretic therapy having been added to her antihypertensive medication regimen. She does not report having experienced any recurrent similar episodes since the dosage of the HCTZ was reduced to 12.5 mg every other day at the time of a follow-up visit here on 8/29/18. She has been stable from a cardiac perspective since her previous visit here on 5/22/19. Specifically, she does not describe having experienced any signs or symptoms to suggest the development of an anginal syndrome or congestive heart failure. Her cardiac examination today is unremarkable. Her blood pressure reading was mildly elevated upon initial presentation to the office today, however, a follow-up measurement obtained after her examination revealed the reading to be satisfactory. Her electrocardiogram today reveals sinus rhythm with voltage criteria for left ventricular hypertrophy, non-specific diminished voltage in leads V5-V6, and non-specific repolarization changes, essentially unchanged from her previous office tracing.\par \par As her follow-up blood pressure reading today is satisfactory, I have instructed the patient to continue her antihypertensive medications as presently prescribed for the time being.\par \par I have reviewed the findings of blood test report of 6/4/19 in detail with the patient today, and I have instructed her to continue on the present dose of Crestor for the time being. She anticipates having follow-up blood testing performed in 2 weeks through the office of Dr. Collins for reassessment.\par \par The importance of proper dietary habits, weight loss, and continued regular exercise was again emphasized to the patient today. \par \par I have reviewed the findings of the echocardiogram of 7/19/17 and the carotid artery Doppler study of 7/25/17 in detail with the patient today.\par \par The patient will be following up with her endocrinologist regarding surveillance of her left thyroid nodule.\par \par I have asked the patient to call me if she should have any questions or problems pertaining to these matters, and especially if she should experience suspected recurrence of syncope, or any concerning symptoms. I have otherwise asked her to return to the office for follow-up cardiac evaluation and blood pressure reassessment in 6 months, provided she remains clinically stable in the interim.

## 2019-11-20 NOTE — PHYSICAL EXAM
[General Appearance - In No Acute Distress] : no acute distress [Normal Conjunctiva] : the conjunctiva exhibited no abnormalities [No Oral Pallor] : no oral pallor [Respiration, Rhythm And Depth] : normal respiratory rhythm and effort [Auscultation Breath Sounds / Voice Sounds] : lungs were clear to auscultation bilaterally [Bowel Sounds] : normal bowel sounds [Abdomen Tenderness] : non-tender [Cyanosis, Localized] : no localized cyanosis [Abnormal Walk] : normal gait [Oriented To Time, Place, And Person] : oriented to person, place, and time [] : no rash [Not Palpable] : not palpable [No Precordial Heave] : no precordial heave was noted [Normal Rate] : normal [Normal S1] : normal S1 [Rhythm Regular] : regular [Normal S2] : normal S2 [No Gallop] : no gallop heard [No Murmur] : no murmurs heard [2+] : left 2+ [No Abnormalities] : the abdominal aorta was not enlarged and no bruit was heard [No Pitting Edema] : no pitting edema present [FreeTextEntry1] : moderately overweight white female [Apical Thrill] : no thrill palpable at the apex [Click] : no click [Right Carotid Bruit] : no bruit heard over the right carotid [Pericardial Rub] : no pericardial rub [Left Carotid Bruit] : no bruit heard over the left carotid

## 2019-12-04 ENCOUNTER — APPOINTMENT (OUTPATIENT)
Dept: INTERNAL MEDICINE | Facility: CLINIC | Age: 69
End: 2019-12-04
Payer: MEDICARE

## 2019-12-04 ENCOUNTER — LABORATORY RESULT (OUTPATIENT)
Age: 69
End: 2019-12-04

## 2019-12-04 VITALS
DIASTOLIC BLOOD PRESSURE: 90 MMHG | WEIGHT: 170 LBS | OXYGEN SATURATION: 96 % | TEMPERATURE: 98.5 F | SYSTOLIC BLOOD PRESSURE: 160 MMHG | RESPIRATION RATE: 14 BRPM | HEART RATE: 90 BPM | BODY MASS INDEX: 32.12 KG/M2

## 2019-12-04 VITALS — DIASTOLIC BLOOD PRESSURE: 82 MMHG | SYSTOLIC BLOOD PRESSURE: 132 MMHG

## 2019-12-04 PROCEDURE — 99214 OFFICE O/P EST MOD 30 MIN: CPT

## 2019-12-04 NOTE — PHYSICAL EXAM
[No Acute Distress] : no acute distress [Well Nourished] : well nourished [Well Developed] : well developed [Well-Appearing] : well-appearing [Normal Sclera/Conjunctiva] : normal sclera/conjunctiva [PERRL] : pupils equal round and reactive to light [EOMI] : extraocular movements intact [Normal Outer Ear/Nose] : the outer ears and nose were normal in appearance [Normal Oropharynx] : the oropharynx was normal [No JVD] : no jugular venous distention [No Lymphadenopathy] : no lymphadenopathy [Supple] : supple [Thyroid Normal, No Nodules] : the thyroid was normal and there were no nodules present [No Respiratory Distress] : no respiratory distress  [Clear to Auscultation] : lungs were clear to auscultation bilaterally [No Accessory Muscle Use] : no accessory muscle use [Normal Rate] : normal rate  [Regular Rhythm] : with a regular rhythm [Normal S1, S2] : normal S1 and S2 [No Murmur] : no murmur heard [No Carotid Bruits] : no carotid bruits [No Abdominal Bruit] : a ~M bruit was not heard ~T in the abdomen [No Varicosities] : no varicosities [Pedal Pulses Present] : the pedal pulses are present [No Edema] : there was no peripheral edema [No Palpable Aorta] : no palpable aorta [No Extremity Clubbing/Cyanosis] : no extremity clubbing/cyanosis [Soft] : abdomen soft [Non Tender] : non-tender [Non-distended] : non-distended [No Masses] : no abdominal mass palpated [No HSM] : no HSM [Normal Bowel Sounds] : normal bowel sounds [Normal Posterior Cervical Nodes] : no posterior cervical lymphadenopathy [Normal Anterior Cervical Nodes] : no anterior cervical lymphadenopathy [No CVA Tenderness] : no CVA  tenderness [No Spinal Tenderness] : no spinal tenderness [No Joint Swelling] : no joint swelling [Grossly Normal Strength/Tone] : grossly normal strength/tone [No Rash] : no rash [Coordination Grossly Intact] : coordination grossly intact [No Focal Deficits] : no focal deficits [Normal Gait] : normal gait [Deep Tendon Reflexes (DTR)] : deep tendon reflexes were 2+ and symmetric [Normal Affect] : the affect was normal [Normal Insight/Judgement] : insight and judgment were intact

## 2019-12-04 NOTE — HISTORY OF PRESENT ILLNESS
[de-identified] : Pt here today blood pressure check.\par Pt history of hyperlipidemia- stable on Meds\par Pt had epidural and doing better with back pain\par Pt has been feeling down recently about getting older

## 2019-12-04 NOTE — ASSESSMENT
[FreeTextEntry1] : HTN- repeat better\par Hyperlipidemia- stable on Meds\par Hypothyroidism- check labs today

## 2019-12-05 LAB
25(OH)D3 SERPL-MCNC: 43.8 NG/ML
ALBUMIN SERPL ELPH-MCNC: 4.6 G/DL
ALP BLD-CCNC: 80 U/L
ALT SERPL-CCNC: 18 U/L
ANION GAP SERPL CALC-SCNC: 14 MMOL/L
APPEARANCE: CLEAR
AST SERPL-CCNC: 15 U/L
BACTERIA: NEGATIVE
BASOPHILS # BLD AUTO: 0.05 K/UL
BASOPHILS NFR BLD AUTO: 0.6 %
BILIRUB SERPL-MCNC: 0.4 MG/DL
BILIRUBIN URINE: NEGATIVE
BLOOD URINE: ABNORMAL
BUN SERPL-MCNC: 17 MG/DL
CALCIUM SERPL-MCNC: 10.2 MG/DL
CHLORIDE SERPL-SCNC: 102 MMOL/L
CHOLEST SERPL-MCNC: 177 MG/DL
CHOLEST/HDLC SERPL: 2.6 RATIO
CO2 SERPL-SCNC: 28 MMOL/L
COLOR: NORMAL
CREAT SERPL-MCNC: 0.82 MG/DL
EOSINOPHIL # BLD AUTO: 0.08 K/UL
EOSINOPHIL NFR BLD AUTO: 1 %
ESTIMATED AVERAGE GLUCOSE: 123 MG/DL
FOLATE SERPL-MCNC: >20 NG/ML
GLUCOSE QUALITATIVE U: NEGATIVE
GLUCOSE SERPL-MCNC: 101 MG/DL
HBA1C MFR BLD HPLC: 5.9 %
HCT VFR BLD CALC: 44.3 %
HDLC SERPL-MCNC: 67 MG/DL
HGB BLD-MCNC: 14.1 G/DL
HYALINE CASTS: 1 /LPF
IMM GRANULOCYTES NFR BLD AUTO: 0.2 %
KETONES URINE: NEGATIVE
LDLC SERPL CALC-MCNC: 78 MG/DL
LEUKOCYTE ESTERASE URINE: NEGATIVE
LYMPHOCYTES # BLD AUTO: 2.55 K/UL
LYMPHOCYTES NFR BLD AUTO: 30.4 %
MAN DIFF?: NORMAL
MCHC RBC-ENTMCNC: 28.7 PG
MCHC RBC-ENTMCNC: 31.8 GM/DL
MCV RBC AUTO: 90.2 FL
MICROSCOPIC-UA: NORMAL
MONOCYTES # BLD AUTO: 0.49 K/UL
MONOCYTES NFR BLD AUTO: 5.8 %
NEUTROPHILS # BLD AUTO: 5.21 K/UL
NEUTROPHILS NFR BLD AUTO: 62 %
NITRITE URINE: NEGATIVE
PH URINE: 7
PLATELET # BLD AUTO: 199 K/UL
POTASSIUM SERPL-SCNC: 4.2 MMOL/L
PROT SERPL-MCNC: 7.3 G/DL
PROTEIN URINE: NEGATIVE
RBC # BLD: 4.91 M/UL
RBC # FLD: 13.8 %
RED BLOOD CELLS URINE: 8 /HPF
SODIUM SERPL-SCNC: 144 MMOL/L
SPECIFIC GRAVITY URINE: 1.01
SQUAMOUS EPITHELIAL CELLS: 1 /HPF
T3RU NFR SERPL: 0.9 TBI
T4 FREE SERPL-MCNC: 1.8 NG/DL
TRIGL SERPL-MCNC: 161 MG/DL
TSH SERPL-ACNC: 0.75 UIU/ML
UROBILINOGEN URINE: NORMAL
VIT B12 SERPL-MCNC: 718 PG/ML
WBC # FLD AUTO: 8.4 K/UL
WHITE BLOOD CELLS URINE: 1 /HPF

## 2019-12-17 ENCOUNTER — RX RENEWAL (OUTPATIENT)
Age: 69
End: 2019-12-17

## 2020-01-06 ENCOUNTER — MEDICATION RENEWAL (OUTPATIENT)
Age: 70
End: 2020-01-06

## 2020-06-04 LAB
25(OH)D3 SERPL-MCNC: 37.9 NG/ML
ALBUMIN SERPL ELPH-MCNC: 4.6 G/DL
ALP BLD-CCNC: 87 U/L
ALT SERPL-CCNC: 19 U/L
ANION GAP SERPL CALC-SCNC: 12 MMOL/L
APPEARANCE: CLEAR
AST SERPL-CCNC: 17 U/L
BACTERIA: NEGATIVE
BASOPHILS # BLD AUTO: 0.03 K/UL
BASOPHILS NFR BLD AUTO: 0.4 %
BILIRUB SERPL-MCNC: 0.3 MG/DL
BILIRUBIN URINE: NEGATIVE
BLOOD URINE: ABNORMAL
BUN SERPL-MCNC: 14 MG/DL
CALCIUM SERPL-MCNC: 10.1 MG/DL
CHLORIDE SERPL-SCNC: 104 MMOL/L
CHOLEST SERPL-MCNC: 168 MG/DL
CHOLEST/HDLC SERPL: 3.4 RATIO
CO2 SERPL-SCNC: 27 MMOL/L
COLOR: NORMAL
CREAT SERPL-MCNC: 0.79 MG/DL
EOSINOPHIL # BLD AUTO: 0.23 K/UL
EOSINOPHIL NFR BLD AUTO: 2.8 %
ESTIMATED AVERAGE GLUCOSE: 126 MG/DL
FOLATE SERPL-MCNC: >20 NG/ML
GLUCOSE QUALITATIVE U: NEGATIVE
GLUCOSE SERPL-MCNC: 105 MG/DL
HBA1C MFR BLD HPLC: 6 %
HCT VFR BLD CALC: 45.2 %
HDLC SERPL-MCNC: 50 MG/DL
HGB BLD-MCNC: 13.9 G/DL
HYALINE CASTS: 0 /LPF
IMM GRANULOCYTES NFR BLD AUTO: 0.2 %
KETONES URINE: NEGATIVE
LDLC SERPL CALC-MCNC: 76 MG/DL
LEUKOCYTE ESTERASE URINE: ABNORMAL
LYMPHOCYTES # BLD AUTO: 3.55 K/UL
LYMPHOCYTES NFR BLD AUTO: 43.2 %
MAN DIFF?: NORMAL
MCHC RBC-ENTMCNC: 27.7 PG
MCHC RBC-ENTMCNC: 30.8 GM/DL
MCV RBC AUTO: 90.2 FL
MICROSCOPIC-UA: NORMAL
MONOCYTES # BLD AUTO: 0.53 K/UL
MONOCYTES NFR BLD AUTO: 6.5 %
NEUTROPHILS # BLD AUTO: 3.85 K/UL
NEUTROPHILS NFR BLD AUTO: 46.9 %
NITRITE URINE: NEGATIVE
PH URINE: 6.5
PLATELET # BLD AUTO: 263 K/UL
POTASSIUM SERPL-SCNC: 4.7 MMOL/L
PROT SERPL-MCNC: 7.1 G/DL
PROTEIN URINE: NEGATIVE
RBC # BLD: 5.01 M/UL
RBC # FLD: 14.2 %
RED BLOOD CELLS URINE: 1 /HPF
SODIUM SERPL-SCNC: 142 MMOL/L
SPECIFIC GRAVITY URINE: 1.01
SQUAMOUS EPITHELIAL CELLS: 2 /HPF
TRIGL SERPL-MCNC: 207 MG/DL
TSH SERPL-ACNC: 1.05 UIU/ML
UROBILINOGEN URINE: NORMAL
VIT B12 SERPL-MCNC: 719 PG/ML
WBC # FLD AUTO: 8.21 K/UL
WHITE BLOOD CELLS URINE: 2 /HPF

## 2020-06-09 ENCOUNTER — APPOINTMENT (OUTPATIENT)
Dept: INTERNAL MEDICINE | Facility: CLINIC | Age: 70
End: 2020-06-09
Payer: MEDICARE

## 2020-06-09 VITALS
BODY MASS INDEX: 33.23 KG/M2 | DIASTOLIC BLOOD PRESSURE: 82 MMHG | TEMPERATURE: 99.4 F | HEART RATE: 81 BPM | SYSTOLIC BLOOD PRESSURE: 140 MMHG | WEIGHT: 176 LBS | OXYGEN SATURATION: 97 % | RESPIRATION RATE: 14 BRPM | HEIGHT: 61 IN

## 2020-06-09 VITALS — SYSTOLIC BLOOD PRESSURE: 132 MMHG | DIASTOLIC BLOOD PRESSURE: 82 MMHG

## 2020-06-09 DIAGNOSIS — R53.83 OTHER FATIGUE: ICD-10-CM

## 2020-06-09 PROCEDURE — 36415 COLL VENOUS BLD VENIPUNCTURE: CPT

## 2020-06-09 PROCEDURE — G0439: CPT

## 2020-06-09 NOTE — HEALTH RISK ASSESSMENT
[Very Good] : ~his/her~  mood as very good [0] : 2) Feeling down, depressed, or hopeless: Not at all (0) [Patient reported mammogram was normal] : Patient reported mammogram was normal [Patient reported colonoscopy was normal] : Patient reported colonoscopy was normal [MammogramDate] : 06/19 [ColonoscopyDate] : 03/16

## 2020-06-09 NOTE — ASSESSMENT
[FreeTextEntry1] : Hyperlipidemia- good control\par HTN_ good control \par Follow up with cardiology. \par HCM- UTD\par Renal cyst followed by urology

## 2020-06-09 NOTE — PHYSICAL EXAM
[No Acute Distress] : no acute distress [Well Nourished] : well nourished [Well Developed] : well developed [Well-Appearing] : well-appearing [PERRL] : pupils equal round and reactive to light [Normal Sclera/Conjunctiva] : normal sclera/conjunctiva [EOMI] : extraocular movements intact [Normal Oropharynx] : the oropharynx was normal [No JVD] : no jugular venous distention [Normal Outer Ear/Nose] : the outer ears and nose were normal in appearance [No Lymphadenopathy] : no lymphadenopathy [Thyroid Normal, No Nodules] : the thyroid was normal and there were no nodules present [Supple] : supple [No Accessory Muscle Use] : no accessory muscle use [No Respiratory Distress] : no respiratory distress  [Clear to Auscultation] : lungs were clear to auscultation bilaterally [Normal Rate] : normal rate  [Regular Rhythm] : with a regular rhythm [Normal S1, S2] : normal S1 and S2 [No Murmur] : no murmur heard [No Carotid Bruits] : no carotid bruits [No Abdominal Bruit] : a ~M bruit was not heard ~T in the abdomen [No Varicosities] : no varicosities [Pedal Pulses Present] : the pedal pulses are present [No Edema] : there was no peripheral edema [No Palpable Aorta] : no palpable aorta [Soft] : abdomen soft [No Extremity Clubbing/Cyanosis] : no extremity clubbing/cyanosis [Non Tender] : non-tender [Non-distended] : non-distended [No Masses] : no abdominal mass palpated [No HSM] : no HSM [Normal Posterior Cervical Nodes] : no posterior cervical lymphadenopathy [Normal Bowel Sounds] : normal bowel sounds [Normal Anterior Cervical Nodes] : no anterior cervical lymphadenopathy [No CVA Tenderness] : no CVA  tenderness [No Spinal Tenderness] : no spinal tenderness [No Joint Swelling] : no joint swelling [Grossly Normal Strength/Tone] : grossly normal strength/tone [No Rash] : no rash [Coordination Grossly Intact] : coordination grossly intact [No Focal Deficits] : no focal deficits [Normal Gait] : normal gait [Normal Affect] : the affect was normal [Deep Tendon Reflexes (DTR)] : deep tendon reflexes were 2+ and symmetric [Normal Insight/Judgement] : insight and judgment were intact

## 2020-06-09 NOTE — HISTORY OF PRESENT ILLNESS
[de-identified] : Followed by endo for thyroid\par Followed by cardiology\par Pt with renal cyst- followed by urology

## 2020-06-16 ENCOUNTER — NON-APPOINTMENT (OUTPATIENT)
Age: 70
End: 2020-06-16

## 2020-06-16 ENCOUNTER — APPOINTMENT (OUTPATIENT)
Dept: CARDIOLOGY | Facility: CLINIC | Age: 70
End: 2020-06-16
Payer: MEDICARE

## 2020-06-16 VITALS
HEART RATE: 69 BPM | SYSTOLIC BLOOD PRESSURE: 173 MMHG | DIASTOLIC BLOOD PRESSURE: 90 MMHG | OXYGEN SATURATION: 96 % | HEIGHT: 61 IN | BODY MASS INDEX: 33.04 KG/M2 | WEIGHT: 175 LBS

## 2020-06-16 PROCEDURE — 93000 ELECTROCARDIOGRAM COMPLETE: CPT

## 2020-06-16 PROCEDURE — 99215 OFFICE O/P EST HI 40 MIN: CPT

## 2020-06-16 RX ORDER — VALSARTAN 320 MG/1
320 TABLET, COATED ORAL
Qty: 90 | Refills: 3 | Status: DISCONTINUED | COMMUNITY
Start: 2019-04-09 | End: 2020-06-16

## 2020-07-21 ENCOUNTER — APPOINTMENT (OUTPATIENT)
Dept: INTERNAL MEDICINE | Facility: CLINIC | Age: 70
End: 2020-07-21
Payer: MEDICARE

## 2020-07-21 ENCOUNTER — NON-APPOINTMENT (OUTPATIENT)
Age: 70
End: 2020-07-21

## 2020-07-21 VITALS
HEART RATE: 74 BPM | BODY MASS INDEX: 31.72 KG/M2 | DIASTOLIC BLOOD PRESSURE: 82 MMHG | TEMPERATURE: 98.5 F | OXYGEN SATURATION: 98 % | SYSTOLIC BLOOD PRESSURE: 128 MMHG | HEIGHT: 61 IN | WEIGHT: 168 LBS | RESPIRATION RATE: 14 BRPM

## 2020-07-21 DIAGNOSIS — Z01.818 ENCOUNTER FOR OTHER PREPROCEDURAL EXAMINATION: ICD-10-CM

## 2020-07-21 PROCEDURE — 99214 OFFICE O/P EST MOD 30 MIN: CPT | Mod: 25

## 2020-07-21 PROCEDURE — 93000 ELECTROCARDIOGRAM COMPLETE: CPT

## 2020-07-21 PROCEDURE — 36415 COLL VENOUS BLD VENIPUNCTURE: CPT

## 2020-07-21 NOTE — PHYSICAL EXAM
[No Acute Distress] : no acute distress [Well Nourished] : well nourished [Well Developed] : well developed [Well-Appearing] : well-appearing [Normal Sclera/Conjunctiva] : normal sclera/conjunctiva [PERRL] : pupils equal round and reactive to light [EOMI] : extraocular movements intact [Normal Outer Ear/Nose] : the outer ears and nose were normal in appearance [Normal Oropharynx] : the oropharynx was normal [No Lymphadenopathy] : no lymphadenopathy [No JVD] : no jugular venous distention [Supple] : supple [Thyroid Normal, No Nodules] : the thyroid was normal and there were no nodules present [No Respiratory Distress] : no respiratory distress  [No Accessory Muscle Use] : no accessory muscle use [Clear to Auscultation] : lungs were clear to auscultation bilaterally [Normal Rate] : normal rate  [Normal S1, S2] : normal S1 and S2 [Regular Rhythm] : with a regular rhythm [No Murmur] : no murmur heard [No Carotid Bruits] : no carotid bruits [No Varicosities] : no varicosities [No Abdominal Bruit] : a ~M bruit was not heard ~T in the abdomen [No Edema] : there was no peripheral edema [Pedal Pulses Present] : the pedal pulses are present [No Palpable Aorta] : no palpable aorta [No Extremity Clubbing/Cyanosis] : no extremity clubbing/cyanosis [Non-distended] : non-distended [Non Tender] : non-tender [Soft] : abdomen soft [Normal Bowel Sounds] : normal bowel sounds [No HSM] : no HSM [No Masses] : no abdominal mass palpated [Normal Anterior Cervical Nodes] : no anterior cervical lymphadenopathy [No CVA Tenderness] : no CVA  tenderness [Normal Posterior Cervical Nodes] : no posterior cervical lymphadenopathy [No Spinal Tenderness] : no spinal tenderness [No Joint Swelling] : no joint swelling [Grossly Normal Strength/Tone] : grossly normal strength/tone [No Rash] : no rash [Coordination Grossly Intact] : coordination grossly intact [Normal Gait] : normal gait [Deep Tendon Reflexes (DTR)] : deep tendon reflexes were 2+ and symmetric [No Focal Deficits] : no focal deficits [Normal Insight/Judgement] : insight and judgment were intact [Normal Affect] : the affect was normal

## 2020-07-22 LAB
ALBUMIN SERPL ELPH-MCNC: 4.8 G/DL
ALP BLD-CCNC: 84 U/L
ALT SERPL-CCNC: 17 U/L
ANION GAP SERPL CALC-SCNC: 15 MMOL/L
APPEARANCE: CLEAR
APTT BLD: 30 SEC
AST SERPL-CCNC: 18 U/L
BACTERIA: NEGATIVE
BASOPHILS # BLD AUTO: 0.04 K/UL
BASOPHILS NFR BLD AUTO: 0.5 %
BILIRUB SERPL-MCNC: 0.4 MG/DL
BILIRUBIN URINE: NEGATIVE
BLOOD URINE: ABNORMAL
BUN SERPL-MCNC: 15 MG/DL
CALCIUM SERPL-MCNC: 10.1 MG/DL
CHLORIDE SERPL-SCNC: 104 MMOL/L
CO2 SERPL-SCNC: 26 MMOL/L
COLOR: NORMAL
CREAT SERPL-MCNC: 0.86 MG/DL
EOSINOPHIL # BLD AUTO: 0.13 K/UL
EOSINOPHIL NFR BLD AUTO: 1.7 %
GLUCOSE QUALITATIVE U: NEGATIVE
GLUCOSE SERPL-MCNC: 96 MG/DL
HCT VFR BLD CALC: 45.5 %
HGB BLD-MCNC: 13.6 G/DL
HYALINE CASTS: 0 /LPF
IMM GRANULOCYTES NFR BLD AUTO: 0.1 %
INR PPP: 0.99 RATIO
KETONES URINE: NEGATIVE
LEUKOCYTE ESTERASE URINE: NEGATIVE
LYMPHOCYTES # BLD AUTO: 2.8 K/UL
LYMPHOCYTES NFR BLD AUTO: 37.5 %
MAN DIFF?: NORMAL
MCHC RBC-ENTMCNC: 27.6 PG
MCHC RBC-ENTMCNC: 29.9 GM/DL
MCV RBC AUTO: 92.3 FL
MICROSCOPIC-UA: NORMAL
MONOCYTES # BLD AUTO: 0.53 K/UL
MONOCYTES NFR BLD AUTO: 7.1 %
NEUTROPHILS # BLD AUTO: 3.96 K/UL
NEUTROPHILS NFR BLD AUTO: 53.1 %
NITRITE URINE: NEGATIVE
PH URINE: 6
PLATELET # BLD AUTO: 287 K/UL
POTASSIUM SERPL-SCNC: 4.4 MMOL/L
PROT SERPL-MCNC: 7.2 G/DL
PROTEIN URINE: NEGATIVE
PT BLD: 11.7 SEC
RBC # BLD: 4.93 M/UL
RBC # FLD: 14.2 %
RED BLOOD CELLS URINE: 7 /HPF
SODIUM SERPL-SCNC: 144 MMOL/L
SPECIFIC GRAVITY URINE: 1.01
SQUAMOUS EPITHELIAL CELLS: 0 /HPF
UROBILINOGEN URINE: NORMAL
WBC # FLD AUTO: 7.47 K/UL
WHITE BLOOD CELLS URINE: 1 /HPF

## 2020-07-22 NOTE — ASSESSMENT
[Patient Optimized for Surgery] : Patient optimized for surgery [FreeTextEntry4] : Pt medically cleared for proposed procedure.

## 2020-07-22 NOTE — HISTORY OF PRESENT ILLNESS
[No Pertinent Cardiac History] : no history of aortic stenosis, atrial fibrillation, coronary artery disease, recent myocardial infarction, or implantable device/pacemaker [FreeTextEntry3] : Dr. Mcnally [FreeTextEntry2] : 7/30/20 [FreeTextEntry1] : left lumpectomy [FreeTextEntry4] : Good exercise tolerance\par History of hypertension, hyperlipidemia and hypothryoidism

## 2020-12-02 ENCOUNTER — NON-APPOINTMENT (OUTPATIENT)
Age: 70
End: 2020-12-02

## 2020-12-02 ENCOUNTER — APPOINTMENT (OUTPATIENT)
Dept: CARDIOLOGY | Facility: CLINIC | Age: 70
End: 2020-12-02
Payer: MEDICARE

## 2020-12-02 VITALS
HEART RATE: 64 BPM | DIASTOLIC BLOOD PRESSURE: 80 MMHG | OXYGEN SATURATION: 96 % | WEIGHT: 170 LBS | HEIGHT: 61 IN | BODY MASS INDEX: 32.1 KG/M2 | SYSTOLIC BLOOD PRESSURE: 158 MMHG

## 2020-12-02 PROCEDURE — 99072 ADDL SUPL MATRL&STAF TM PHE: CPT

## 2020-12-02 PROCEDURE — 93000 ELECTROCARDIOGRAM COMPLETE: CPT

## 2020-12-02 PROCEDURE — 99215 OFFICE O/P EST HI 40 MIN: CPT

## 2020-12-09 ENCOUNTER — APPOINTMENT (OUTPATIENT)
Dept: INTERNAL MEDICINE | Facility: CLINIC | Age: 70
End: 2020-12-09
Payer: MEDICARE

## 2020-12-09 VITALS
BODY MASS INDEX: 32.1 KG/M2 | WEIGHT: 170 LBS | OXYGEN SATURATION: 99 % | HEART RATE: 62 BPM | HEIGHT: 61 IN | TEMPERATURE: 97.7 F | DIASTOLIC BLOOD PRESSURE: 90 MMHG | SYSTOLIC BLOOD PRESSURE: 154 MMHG | RESPIRATION RATE: 14 BRPM

## 2020-12-09 PROCEDURE — 99072 ADDL SUPL MATRL&STAF TM PHE: CPT

## 2020-12-09 PROCEDURE — 99214 OFFICE O/P EST MOD 30 MIN: CPT

## 2020-12-09 NOTE — HISTORY OF PRESENT ILLNESS
[de-identified] : Pt here today for blood pressure check. \par History of hyperlipidemia- stable on meds\par History of hypothyroidism- needs blood work

## 2020-12-09 NOTE — ASSESSMENT
[FreeTextEntry1] : HTN- scheduled for 24 monitor blood pressure\par Hyperlipidemia- check lipid today\par Hypothyroidism- check labs.

## 2020-12-10 LAB
25(OH)D3 SERPL-MCNC: 40 NG/ML
ALBUMIN SERPL ELPH-MCNC: 4.8 G/DL
ALP BLD-CCNC: 102 U/L
ALT SERPL-CCNC: 15 U/L
ANION GAP SERPL CALC-SCNC: 13 MMOL/L
AST SERPL-CCNC: 16 U/L
BASOPHILS # BLD AUTO: 0.04 K/UL
BASOPHILS NFR BLD AUTO: 0.6 %
BILIRUB SERPL-MCNC: 0.4 MG/DL
BUN SERPL-MCNC: 18 MG/DL
CALCIUM SERPL-MCNC: 9.9 MG/DL
CHLORIDE SERPL-SCNC: 103 MMOL/L
CHOLEST SERPL-MCNC: 190 MG/DL
CO2 SERPL-SCNC: 28 MMOL/L
CREAT SERPL-MCNC: 0.83 MG/DL
EOSINOPHIL # BLD AUTO: 0.1 K/UL
EOSINOPHIL NFR BLD AUTO: 1.4 %
ESTIMATED AVERAGE GLUCOSE: 120 MG/DL
FOLATE SERPL-MCNC: >20 NG/ML
GLUCOSE SERPL-MCNC: 107 MG/DL
HBA1C MFR BLD HPLC: 5.8 %
HCT VFR BLD CALC: 44.8 %
HDLC SERPL-MCNC: 52 MG/DL
HGB BLD-MCNC: 14 G/DL
IMM GRANULOCYTES NFR BLD AUTO: 0.3 %
LDLC SERPL CALC-MCNC: 98 MG/DL
LYMPHOCYTES # BLD AUTO: 2.83 K/UL
LYMPHOCYTES NFR BLD AUTO: 39.7 %
MAN DIFF?: NORMAL
MCHC RBC-ENTMCNC: 28.1 PG
MCHC RBC-ENTMCNC: 31.3 GM/DL
MCV RBC AUTO: 90 FL
MONOCYTES # BLD AUTO: 0.38 K/UL
MONOCYTES NFR BLD AUTO: 5.3 %
NEUTROPHILS # BLD AUTO: 3.76 K/UL
NEUTROPHILS NFR BLD AUTO: 52.7 %
NONHDLC SERPL-MCNC: 138 MG/DL
PLATELET # BLD AUTO: 217 K/UL
POTASSIUM SERPL-SCNC: 4.7 MMOL/L
PROT SERPL-MCNC: 7.4 G/DL
RBC # BLD: 4.98 M/UL
RBC # FLD: 14 %
SODIUM SERPL-SCNC: 144 MMOL/L
T4 SERPL-MCNC: 9.2 UG/DL
TRIGL SERPL-MCNC: 203 MG/DL
TSH SERPL-ACNC: 1.09 UIU/ML
VIT B12 SERPL-MCNC: 639 PG/ML
WBC # FLD AUTO: 7.13 K/UL

## 2021-01-04 ENCOUNTER — APPOINTMENT (OUTPATIENT)
Dept: CARDIOLOGY | Facility: CLINIC | Age: 71
End: 2021-01-04

## 2021-01-20 ENCOUNTER — APPOINTMENT (OUTPATIENT)
Dept: CARDIOLOGY | Facility: CLINIC | Age: 71
End: 2021-01-20
Payer: MEDICARE

## 2021-01-20 PROCEDURE — 93790 AMBL BP MNTR W/SW I&R: CPT

## 2021-01-20 PROCEDURE — 99072 ADDL SUPL MATRL&STAF TM PHE: CPT

## 2021-05-05 ENCOUNTER — RX RENEWAL (OUTPATIENT)
Age: 71
End: 2021-05-05

## 2021-05-20 ENCOUNTER — RX RENEWAL (OUTPATIENT)
Age: 71
End: 2021-05-20

## 2021-05-26 ENCOUNTER — APPOINTMENT (OUTPATIENT)
Dept: CARDIOLOGY | Facility: CLINIC | Age: 71
End: 2021-05-26
Payer: MEDICARE

## 2021-05-26 ENCOUNTER — NON-APPOINTMENT (OUTPATIENT)
Age: 71
End: 2021-05-26

## 2021-05-26 VITALS
OXYGEN SATURATION: 96 % | BODY MASS INDEX: 31.34 KG/M2 | HEART RATE: 60 BPM | SYSTOLIC BLOOD PRESSURE: 153 MMHG | HEIGHT: 61 IN | DIASTOLIC BLOOD PRESSURE: 82 MMHG | WEIGHT: 166 LBS

## 2021-05-26 DIAGNOSIS — K57.30 DIVERTICULOSIS OF LARGE INTESTINE W/OUT PERFORATION OR ABSCESS W/OUT BLEEDING: ICD-10-CM

## 2021-05-26 PROCEDURE — 93000 ELECTROCARDIOGRAM COMPLETE: CPT

## 2021-05-26 PROCEDURE — 99072 ADDL SUPL MATRL&STAF TM PHE: CPT

## 2021-05-26 PROCEDURE — 99215 OFFICE O/P EST HI 40 MIN: CPT

## 2021-06-07 ENCOUNTER — RESULT REVIEW (OUTPATIENT)
Age: 71
End: 2021-06-07

## 2021-06-10 ENCOUNTER — APPOINTMENT (OUTPATIENT)
Dept: INTERNAL MEDICINE | Facility: CLINIC | Age: 71
End: 2021-06-10
Payer: MEDICARE

## 2021-06-10 VITALS
OXYGEN SATURATION: 97 % | HEART RATE: 74 BPM | DIASTOLIC BLOOD PRESSURE: 90 MMHG | WEIGHT: 174 LBS | SYSTOLIC BLOOD PRESSURE: 144 MMHG | TEMPERATURE: 97.7 F | BODY MASS INDEX: 32.88 KG/M2 | RESPIRATION RATE: 14 BRPM

## 2021-06-10 VITALS — DIASTOLIC BLOOD PRESSURE: 80 MMHG | SYSTOLIC BLOOD PRESSURE: 132 MMHG

## 2021-06-10 PROCEDURE — G0439: CPT

## 2021-06-10 PROCEDURE — 99072 ADDL SUPL MATRL&STAF TM PHE: CPT

## 2021-06-10 NOTE — HEALTH RISK ASSESSMENT
[Good] : ~his/her~  mood as  good [No falls in past year] : Patient reported no falls in the past year [0] : 2) Feeling down, depressed, or hopeless: Not at all (0) [Patient reported mammogram was normal] : Patient reported mammogram was normal [Patient reported PAP Smear was normal] : Patient reported PAP Smear was normal [Patient reported colonoscopy was normal] : Patient reported colonoscopy was normal [MammogramDate] : 02/21 [PapSmearDate] : 05/21 [ColonoscopyDate] : 05/21

## 2021-06-10 NOTE — HISTORY OF PRESENT ILLNESS
[de-identified] : Followed by endo for underactive thyroid ( DR. Anthony Galloway)\par Followed by cardiology for blood pressure and hyperlipidemia\par Pt had blood in urine- followed by urology ( [email or web address])

## 2021-06-10 NOTE — ASSESSMENT
[FreeTextEntry1] : HTN- good control\par Hypothyroidism- follow up with endo\par Hyperlipidemia- check lipid\par BLood in urine- followed by urology\par HCM- [place]

## 2021-06-11 LAB
25(OH)D3 SERPL-MCNC: 34.7 NG/ML
ALBUMIN SERPL ELPH-MCNC: 4.6 G/DL
ALP BLD-CCNC: 86 U/L
ALT SERPL-CCNC: 18 U/L
ANION GAP SERPL CALC-SCNC: 14 MMOL/L
AST SERPL-CCNC: 16 U/L
BASOPHILS # BLD AUTO: 0.04 K/UL
BASOPHILS NFR BLD AUTO: 0.6 %
BILIRUB SERPL-MCNC: 0.5 MG/DL
BUN SERPL-MCNC: 18 MG/DL
CALCIUM SERPL-MCNC: 9.7 MG/DL
CHLORIDE SERPL-SCNC: 102 MMOL/L
CHOLEST SERPL-MCNC: 192 MG/DL
CO2 SERPL-SCNC: 26 MMOL/L
CREAT SERPL-MCNC: 0.74 MG/DL
EOSINOPHIL # BLD AUTO: 0.07 K/UL
EOSINOPHIL NFR BLD AUTO: 1.1 %
ESTIMATED AVERAGE GLUCOSE: 120 MG/DL
FOLATE SERPL-MCNC: >20 NG/ML
GLUCOSE SERPL-MCNC: 107 MG/DL
HBA1C MFR BLD HPLC: 5.8 %
HCT VFR BLD CALC: 42.6 %
HDLC SERPL-MCNC: 56 MG/DL
HGB BLD-MCNC: 13.7 G/DL
IMM GRANULOCYTES NFR BLD AUTO: 0.3 %
LDLC SERPL CALC-MCNC: 90 MG/DL
LYMPHOCYTES # BLD AUTO: 2.37 K/UL
LYMPHOCYTES NFR BLD AUTO: 35.9 %
MAN DIFF?: NORMAL
MCHC RBC-ENTMCNC: 28.1 PG
MCHC RBC-ENTMCNC: 32.2 GM/DL
MCV RBC AUTO: 87.3 FL
MONOCYTES # BLD AUTO: 0.36 K/UL
MONOCYTES NFR BLD AUTO: 5.4 %
NEUTROPHILS # BLD AUTO: 3.75 K/UL
NEUTROPHILS NFR BLD AUTO: 56.7 %
NONHDLC SERPL-MCNC: 136 MG/DL
PLATELET # BLD AUTO: 211 K/UL
POTASSIUM SERPL-SCNC: 4.2 MMOL/L
PROT SERPL-MCNC: 7.1 G/DL
RBC # BLD: 4.88 M/UL
RBC # FLD: 14 %
SODIUM SERPL-SCNC: 141 MMOL/L
TRIGL SERPL-MCNC: 234 MG/DL
VIT B12 SERPL-MCNC: 577 PG/ML
WBC # FLD AUTO: 6.61 K/UL

## 2021-11-08 RX ORDER — MECLIZINE HYDROCHLORIDE 25 MG/1
25 TABLET ORAL 4 TIMES DAILY
Qty: 30 | Refills: 0 | Status: ACTIVE | COMMUNITY
Start: 2019-06-04 | End: 1900-01-01

## 2021-11-15 ENCOUNTER — RX RENEWAL (OUTPATIENT)
Age: 71
End: 2021-11-15

## 2021-11-24 ENCOUNTER — APPOINTMENT (OUTPATIENT)
Dept: CARDIOLOGY | Facility: CLINIC | Age: 71
End: 2021-11-24
Payer: MEDICARE

## 2021-11-24 ENCOUNTER — NON-APPOINTMENT (OUTPATIENT)
Age: 71
End: 2021-11-24

## 2021-11-24 VITALS
BODY MASS INDEX: 31.15 KG/M2 | WEIGHT: 165 LBS | OXYGEN SATURATION: 97 % | SYSTOLIC BLOOD PRESSURE: 151 MMHG | HEIGHT: 61 IN | HEART RATE: 63 BPM | DIASTOLIC BLOOD PRESSURE: 85 MMHG

## 2021-11-24 PROCEDURE — 99215 OFFICE O/P EST HI 40 MIN: CPT

## 2021-11-24 PROCEDURE — 93000 ELECTROCARDIOGRAM COMPLETE: CPT

## 2021-11-29 ENCOUNTER — RX RENEWAL (OUTPATIENT)
Age: 71
End: 2021-11-29

## 2021-12-06 ENCOUNTER — APPOINTMENT (OUTPATIENT)
Dept: RADIOLOGY | Facility: CLINIC | Age: 71
End: 2021-12-06
Payer: MEDICARE

## 2021-12-06 PROCEDURE — 77080 DXA BONE DENSITY AXIAL: CPT

## 2021-12-08 ENCOUNTER — APPOINTMENT (OUTPATIENT)
Dept: INTERNAL MEDICINE | Facility: CLINIC | Age: 71
End: 2021-12-08
Payer: MEDICARE

## 2021-12-08 VITALS
WEIGHT: 165 LBS | TEMPERATURE: 97.6 F | RESPIRATION RATE: 15 BRPM | BODY MASS INDEX: 31.15 KG/M2 | HEIGHT: 61 IN | HEART RATE: 83 BPM | DIASTOLIC BLOOD PRESSURE: 90 MMHG | SYSTOLIC BLOOD PRESSURE: 176 MMHG | OXYGEN SATURATION: 98 %

## 2021-12-08 VITALS — DIASTOLIC BLOOD PRESSURE: 80 MMHG | SYSTOLIC BLOOD PRESSURE: 140 MMHG

## 2021-12-08 PROCEDURE — 99214 OFFICE O/P EST MOD 30 MIN: CPT

## 2021-12-08 NOTE — HISTORY OF PRESENT ILLNESS
[de-identified] : Pt here today for follow up.\par Pt with history of HTN stable on meds- followed by cards. \par Complains of chronic back pain- followed by ortho and pain managementmanagement

## 2021-12-08 NOTE — ASSESSMENT
[FreeTextEntry1] : Back pain- followed by pain management-follow up with spine specialist. \par hypothyroidism- follow up with endo\par HTN- repeat better- pt to follow up with cardiology

## 2021-12-09 LAB
25(OH)D3 SERPL-MCNC: 33.2 NG/ML
ALBUMIN SERPL ELPH-MCNC: 5.1 G/DL
ALP BLD-CCNC: 92 U/L
ALT SERPL-CCNC: 17 U/L
ANION GAP SERPL CALC-SCNC: 14 MMOL/L
APPEARANCE: CLEAR
AST SERPL-CCNC: 16 U/L
BACTERIA UR CULT: NORMAL
BACTERIA: NEGATIVE
BASOPHILS # BLD AUTO: 0.05 K/UL
BASOPHILS NFR BLD AUTO: 0.5 %
BILIRUB SERPL-MCNC: 0.4 MG/DL
BILIRUBIN URINE: NEGATIVE
BLOOD URINE: ABNORMAL
BUN SERPL-MCNC: 14 MG/DL
CALCIUM SERPL-MCNC: 10.3 MG/DL
CHLORIDE SERPL-SCNC: 102 MMOL/L
CHOLEST SERPL-MCNC: 171 MG/DL
CO2 SERPL-SCNC: 27 MMOL/L
COLOR: NORMAL
CREAT SERPL-MCNC: 0.83 MG/DL
EOSINOPHIL # BLD AUTO: 0.17 K/UL
EOSINOPHIL NFR BLD AUTO: 1.7 %
ESTIMATED AVERAGE GLUCOSE: 126 MG/DL
FOLATE SERPL-MCNC: >20 NG/ML
GLUCOSE QUALITATIVE U: NEGATIVE
GLUCOSE SERPL-MCNC: 98 MG/DL
HBA1C MFR BLD HPLC: 6 %
HCT VFR BLD CALC: 45 %
HDLC SERPL-MCNC: 52 MG/DL
HGB BLD-MCNC: 14.1 G/DL
HYALINE CASTS: 0 /LPF
IMM GRANULOCYTES NFR BLD AUTO: 0.3 %
KETONES URINE: NEGATIVE
LDLC SERPL CALC-MCNC: 76 MG/DL
LEUKOCYTE ESTERASE URINE: NEGATIVE
LYMPHOCYTES # BLD AUTO: 2.72 K/UL
LYMPHOCYTES NFR BLD AUTO: 26.7 %
MAN DIFF?: NORMAL
MCHC RBC-ENTMCNC: 28 PG
MCHC RBC-ENTMCNC: 31.3 GM/DL
MCV RBC AUTO: 89.3 FL
MICROSCOPIC-UA: NORMAL
MONOCYTES # BLD AUTO: 0.59 K/UL
MONOCYTES NFR BLD AUTO: 5.8 %
NEUTROPHILS # BLD AUTO: 6.62 K/UL
NEUTROPHILS NFR BLD AUTO: 65 %
NITRITE URINE: NEGATIVE
NONHDLC SERPL-MCNC: 118 MG/DL
PH URINE: 7.5
PLATELET # BLD AUTO: 296 K/UL
POTASSIUM SERPL-SCNC: 4.5 MMOL/L
PROT SERPL-MCNC: 7.1 G/DL
PROTEIN URINE: NEGATIVE
RBC # BLD: 5.04 M/UL
RBC # FLD: 14 %
RED BLOOD CELLS URINE: 2 /HPF
SODIUM SERPL-SCNC: 143 MMOL/L
SPECIFIC GRAVITY URINE: 1.01
SQUAMOUS EPITHELIAL CELLS: 1 /HPF
TRIGL SERPL-MCNC: 213 MG/DL
TSH SERPL-ACNC: 1.59 UIU/ML
UROBILINOGEN URINE: NORMAL
VIT B12 SERPL-MCNC: 527 PG/ML
WBC # FLD AUTO: 10.18 K/UL
WHITE BLOOD CELLS URINE: 1 /HPF

## 2022-04-01 ENCOUNTER — APPOINTMENT (OUTPATIENT)
Dept: ULTRASOUND IMAGING | Facility: CLINIC | Age: 72
End: 2022-04-01
Payer: MEDICARE

## 2022-04-01 PROCEDURE — 76700 US EXAM ABDOM COMPLETE: CPT

## 2022-04-14 ENCOUNTER — NON-APPOINTMENT (OUTPATIENT)
Age: 72
End: 2022-04-14

## 2022-05-02 ENCOUNTER — RX RENEWAL (OUTPATIENT)
Age: 72
End: 2022-05-02

## 2022-05-24 ENCOUNTER — APPOINTMENT (OUTPATIENT)
Dept: CARDIOLOGY | Facility: CLINIC | Age: 72
End: 2022-05-24
Payer: MEDICARE

## 2022-05-24 ENCOUNTER — NON-APPOINTMENT (OUTPATIENT)
Age: 72
End: 2022-05-24

## 2022-05-24 VITALS
DIASTOLIC BLOOD PRESSURE: 92 MMHG | HEIGHT: 61 IN | OXYGEN SATURATION: 97 % | HEART RATE: 60 BPM | BODY MASS INDEX: 33.42 KG/M2 | WEIGHT: 177 LBS | SYSTOLIC BLOOD PRESSURE: 169 MMHG

## 2022-05-24 PROCEDURE — 93000 ELECTROCARDIOGRAM COMPLETE: CPT

## 2022-05-24 PROCEDURE — 99215 OFFICE O/P EST HI 40 MIN: CPT

## 2022-06-08 ENCOUNTER — RESULT REVIEW (OUTPATIENT)
Age: 72
End: 2022-06-08

## 2022-06-14 ENCOUNTER — APPOINTMENT (OUTPATIENT)
Dept: INTERNAL MEDICINE | Facility: CLINIC | Age: 72
End: 2022-06-14
Payer: MEDICARE

## 2022-06-14 VITALS
HEART RATE: 65 BPM | RESPIRATION RATE: 14 BRPM | OXYGEN SATURATION: 98 % | SYSTOLIC BLOOD PRESSURE: 144 MMHG | WEIGHT: 175 LBS | TEMPERATURE: 97.4 F | BODY MASS INDEX: 33.04 KG/M2 | HEIGHT: 61 IN | DIASTOLIC BLOOD PRESSURE: 80 MMHG

## 2022-06-14 PROCEDURE — G0439: CPT

## 2022-06-14 NOTE — HEALTH RISK ASSESSMENT
[Good] : ~his/her~  mood as  good [No falls in past year] : Patient reported no falls in the past year [0] : 2) Feeling down, depressed, or hopeless: Not at all (0) [Patient reported mammogram was normal] : Patient reported mammogram was normal [Patient reported colonoscopy was normal] : Patient reported colonoscopy was normal [MammogramDate] : 03/22 [ColonoscopyDate] : 05/22

## 2022-06-15 LAB
25(OH)D3 SERPL-MCNC: 42.2 NG/ML
ALBUMIN SERPL ELPH-MCNC: 4.9 G/DL
ALP BLD-CCNC: 96 U/L
ALT SERPL-CCNC: 32 U/L
ANION GAP SERPL CALC-SCNC: 17 MMOL/L
APPEARANCE: CLEAR
AST SERPL-CCNC: 25 U/L
BACTERIA: NEGATIVE
BASOPHILS # BLD AUTO: 0.05 K/UL
BASOPHILS NFR BLD AUTO: 0.6 %
BILIRUB SERPL-MCNC: 0.4 MG/DL
BILIRUBIN URINE: NEGATIVE
BLOOD URINE: ABNORMAL
BUN SERPL-MCNC: 13 MG/DL
CALCIUM SERPL-MCNC: 10 MG/DL
CHLORIDE SERPL-SCNC: 102 MMOL/L
CHOLEST SERPL-MCNC: 185 MG/DL
CO2 SERPL-SCNC: 24 MMOL/L
COLOR: COLORLESS
CREAT SERPL-MCNC: 0.79 MG/DL
EGFR: 79 ML/MIN/1.73M2
EOSINOPHIL # BLD AUTO: 0.14 K/UL
EOSINOPHIL NFR BLD AUTO: 1.8 %
ESTIMATED AVERAGE GLUCOSE: 128 MG/DL
FOLATE SERPL-MCNC: >20 NG/ML
GLUCOSE QUALITATIVE U: NEGATIVE
GLUCOSE SERPL-MCNC: 101 MG/DL
HBA1C MFR BLD HPLC: 6.1 %
HCT VFR BLD CALC: 43.7 %
HDLC SERPL-MCNC: 50 MG/DL
HGB BLD-MCNC: 14.3 G/DL
HYALINE CASTS: 0 /LPF
IMM GRANULOCYTES NFR BLD AUTO: 0.3 %
KETONES URINE: NEGATIVE
LDLC SERPL CALC-MCNC: 79 MG/DL
LEUKOCYTE ESTERASE URINE: NEGATIVE
LYMPHOCYTES # BLD AUTO: 3.16 K/UL
LYMPHOCYTES NFR BLD AUTO: 39.7 %
MAN DIFF?: NORMAL
MCHC RBC-ENTMCNC: 28.3 PG
MCHC RBC-ENTMCNC: 32.7 GM/DL
MCV RBC AUTO: 86.5 FL
MICROSCOPIC-UA: NORMAL
MONOCYTES # BLD AUTO: 0.46 K/UL
MONOCYTES NFR BLD AUTO: 5.8 %
NEUTROPHILS # BLD AUTO: 4.13 K/UL
NEUTROPHILS NFR BLD AUTO: 51.8 %
NITRITE URINE: NEGATIVE
NONHDLC SERPL-MCNC: 135 MG/DL
PH URINE: 6.5
PLATELET # BLD AUTO: 236 K/UL
POTASSIUM SERPL-SCNC: 4.5 MMOL/L
PROT SERPL-MCNC: 7.5 G/DL
PROTEIN URINE: NEGATIVE
RBC # BLD: 5.05 M/UL
RBC # FLD: 14.2 %
RED BLOOD CELLS URINE: 5 /HPF
SODIUM SERPL-SCNC: 143 MMOL/L
SPECIFIC GRAVITY URINE: 1
SQUAMOUS EPITHELIAL CELLS: 0 /HPF
TRIGL SERPL-MCNC: 279 MG/DL
UROBILINOGEN URINE: NORMAL
VIT B12 SERPL-MCNC: 638 PG/ML
WBC # FLD AUTO: 7.96 K/UL
WHITE BLOOD CELLS URINE: 1 /HPF

## 2022-07-26 ENCOUNTER — APPOINTMENT (OUTPATIENT)
Dept: PAIN MANAGEMENT | Facility: CLINIC | Age: 72
End: 2022-07-26

## 2022-07-26 VITALS — BODY MASS INDEX: 33.04 KG/M2 | HEIGHT: 61 IN | WEIGHT: 175 LBS

## 2022-07-26 PROCEDURE — 99214 OFFICE O/P EST MOD 30 MIN: CPT

## 2022-07-26 NOTE — HISTORY OF PRESENT ILLNESS
[Lower back] : lower back [7] : 7 [8] : 8 [Dull/Aching] : dull/aching [Radiating] : radiating [Sharp] : sharp [] : yes [Constant] : constant [Meds] : meds [Injection therapy] : injection therapy [Sitting] : sitting [FreeTextEntry1] : b/l  [FreeTextEntry7] : right hip, b/l groin  [FreeTextEntry9] : tylenol

## 2022-07-26 NOTE — PHYSICAL EXAM
[de-identified] : Constitutional; Appears well, no apparent distress\par Ability to communicate: Normal \par Respiratory: non-labored breathing\par Skin: No rash noted\par Head: Normocephalic, atraumatic\par Neck: no visible thyroid enlargement\par Eyes: Extraocular movements intact\par Neurologic: Alert and oriented x3\par Psychiatric: normal mood, affect and behavior \par \par  [Flexion] : flexion [] : non-antalgic

## 2022-07-26 NOTE — DISCUSSION/SUMMARY
[de-identified] : After discussing various treatment options with the patient including but not limited to oral medications, physical therapy, exercise modalities as well as interventional spinal injections, we have decided with the following plan:\par \par - Continue Home exercises, stretching, activity modification, physical therapy, and conservative care.\par - MRI report and/or images was reviewed and discussed with the patient.\par - Recommend L4-5 Lumbar Epidural Steroid Injection under fluoroscopic guidance with image.\par - The risks, benefits and alternatives of the proposed procedure were explained in detail with the patient. The risks outlined include but are not limited to infection, bleeding, post-dural puncture headache, nerve injury, a temporary increase in pain, failure to resolve symptoms, allergic reaction, symptom recurrence, and possible elevation of blood sugar in diabetics. All questions were answered to patient's apparent satisfaction and he/she verbalized an understanding.\par - Patient is presenting with acute/sub-acute radicular pain with impairment in ADLs and functionality.  The pain has not responded to conservative care including NSAID therapy and/or physical therapy.  There is no bleeding tendency, unstable medical condition, or systemic infection.\par - Follow up in 1-2 weeks post injection for re-evaluation.\par

## 2022-08-22 ENCOUNTER — APPOINTMENT (OUTPATIENT)
Dept: PAIN MANAGEMENT | Facility: CLINIC | Age: 72
End: 2022-08-22

## 2022-08-22 PROCEDURE — 62323 NJX INTERLAMINAR LMBR/SAC: CPT

## 2022-08-22 NOTE — PROCEDURE
[FreeTextEntry3] : Date of Service: 08/22/2022 \par \par Account: [id number]\par \par Patient: SLOANE SALCEDO \par \par YOB: 1950\par \par Age: 72 year\par \par \par Surgeon:                                                         Marshall Newsome D.O.\par \par Pre-Operative Diagnosis:                             Lumbosacral radiculitis\par \par Post-Operative Diagnosis:                           Same\par \par Procedure:                                                      Interlaminar lumbar epidural steroid injection (L4-5) under fluoroscopic guidance\par \par Anesthesia:                                                     Local with MAC\par \par \par This procedure was carried out using fluoroscopic guidance.  The risks and benefits of the procedure were discussed extensively with the patient.  The consent of the patient was obtained and the following procedure was performed.\par \par The patient was placed in the prone position.  The lumbar area was prepped and draped in a sterile fashion.  A timeout was performed with all essential staff present and the site and side were verified. Under AP view with slight cephalad-caudad angulation, the L4-5 interspace was identified and marked.  Using sterile technique, the superficial skin was anesthetized with 1% Lidocaine without epinephrine.  A 20-gauge Tuohy needle was advanced into the epidural space under fluoroscopy using [id number] technique and using loss of resistance at the L4-5 level.  After negative aspiration for heme or CSF, an epidurogram was obtained using 2-3 cc Omnipaque contrast injected under live fluoroscopy, confirming epidural placement of the needle.  \par \par Epidurogram showed no evidence of intrathecal or intravascular flow, and good evidence of bilateral epidural flow from L3-S2 levels.  After this, 4 cc of preservative free normal saline plus 12 mg of betamethasone were injected into the epidural space.\par \par The needle was subsequently removed.  Anesthesia personnel were present throughout the procedure.\par \par The patient tolerated the procedure well and was instructed to contact me immediately if there were any problems.\par \par Marshall Newsome D.O.\par

## 2022-09-12 ENCOUNTER — APPOINTMENT (OUTPATIENT)
Dept: PAIN MANAGEMENT | Facility: CLINIC | Age: 72
End: 2022-09-12

## 2022-09-12 VITALS — WEIGHT: 170 LBS | BODY MASS INDEX: 32.1 KG/M2 | HEIGHT: 61 IN

## 2022-09-12 PROCEDURE — 99213 OFFICE O/P EST LOW 20 MIN: CPT

## 2022-09-12 NOTE — DISCUSSION/SUMMARY
[de-identified] : After discussing various treatment options with the patient including but not limited to oral medications, physical therapy, exercise modalities as well as interventional spinal injections, we have decided with the following plan:\par \par - Continue home exercises, stretching, activity modification, physical therapy, and conservative care.\par - Follow-up as needed.\par - Recommend Tylenol 1000mg Q8 hours PRN.\par

## 2022-09-12 NOTE — HISTORY OF PRESENT ILLNESS
[Lower back] : lower back [7] : 7 [4] : 4 [Dull/Aching] : dull/aching [Localized] : localized [Intermittent] : intermittent [Ice] : ice [Injection therapy] : injection therapy [Standing] : standing [Walking] : walking [FreeTextEntry1] : 09/12/2022: s/p L4-5 LESI on 8/22/22 with 90% relief and improvement of ADLs. Still gets dull pain when standing and washing dishes. \par \par 07/26/2022: pt has pain in b/l lower back which radiates occasionally to the b/l groin described as a sharp pain. Takes tylenol PRN.\par \par 10/4/21: s/p LEFT L4-5, L5-S1 TFESI on 9/20/21 with 50% relief and improvement of ADLs. No longer needs to walk with a cane. Continues to use Tylenol PRN. Pt aware of cyst in kidney and sees a urologist. \par \par 8/30/21: Pain started 2 weeks ago and is on the left side of the lower back and radiates into the left buttock described as a sharp pain. \par \par 7.6.21: s/p L4-5 LESI on 6.21.21 with 75-80% relief of pain and improvement of ADLs. Still has some pain in the center of the lower back but much relieved. \par \par 6.8.21: Pain is across the lower back and radiates into the b/l buttocks and radiates down the legs to the toes with some associated numbness. Pain no longer radiates down the legs but still across the lower back. \par \par 11.18.19: s/p L4-5 LESI on 11.7.19 with 90-95% relief of pain and improvement of ADLs. Pt is feeling great and is able to walk better without pain. \par \par 10.23.19: s/p L4-5 LESI on 10.3.19 with 75% relief of pain and improvement of ADLs. Able to walk and get into the car with less pain. Radiation down the left leg is rare/occasional. \par \par 9/3/19: Has been doing PT with some relief but still has pain across the lower back and radiating down the left leg. Takes Tylenol for the pain. \par \par Initial HPI:\par Pain started 4/8/19 and is across the lower back and radiates into the left leg to the big toe. Pain is now improved and just has some dull pain in the lower back. Has been doing PT and completed MDP x2. Allergic to NSAIDs. \par  [] : no [FreeTextEntry8] : WALKING  [TWNoteComboBox1] : 90%

## 2022-09-12 NOTE — PHYSICAL EXAM
[de-identified] : Constitutional; Appears well, no apparent distress\par Ability to communicate: Normal \par Respiratory: non-labored breathing\par Skin: No rash noted\par Head: Normocephalic, atraumatic\par Neck: no visible thyroid enlargement\par Eyes: Extraocular movements intact\par Neurologic: Alert and oriented x3\par Psychiatric: normal mood, affect and behavior \par \par  [Extension] : extension [] : non-antalgic

## 2022-09-12 NOTE — DISCUSSION/SUMMARY
[de-identified] : After discussing various treatment options with the patient including but not limited to oral medications, physical therapy, exercise modalities as well as interventional spinal injections, we have decided with the following plan:\par \par - Continue home exercises, stretching, activity modification, physical therapy, and conservative care.\par - Follow-up as needed.\par - Recommend Tylenol 1000mg Q8 hours PRN.\par

## 2022-09-12 NOTE — PHYSICAL EXAM
[de-identified] : Constitutional; Appears well, no apparent distress\par Ability to communicate: Normal \par Respiratory: non-labored breathing\par Skin: No rash noted\par Head: Normocephalic, atraumatic\par Neck: no visible thyroid enlargement\par Eyes: Extraocular movements intact\par Neurologic: Alert and oriented x3\par Psychiatric: normal mood, affect and behavior \par \par  [Extension] : extension [] : non-antalgic

## 2022-09-29 ENCOUNTER — APPOINTMENT (OUTPATIENT)
Dept: PAIN MANAGEMENT | Facility: CLINIC | Age: 72
End: 2022-09-29

## 2022-09-29 VITALS — HEIGHT: 61 IN | WEIGHT: 170 LBS | BODY MASS INDEX: 32.1 KG/M2

## 2022-09-29 DIAGNOSIS — M54.17 RADICULOPATHY, LUMBOSACRAL REGION: ICD-10-CM

## 2022-09-29 PROCEDURE — 99214 OFFICE O/P EST MOD 30 MIN: CPT

## 2022-09-29 NOTE — HISTORY OF PRESENT ILLNESS
[Lower back] : lower back [10] : 10 [Radiating] : radiating [Sharp] : sharp [Shooting] : shooting [Stabbing] : stabbing [Throbbing] : throbbing [Constant] : constant [Sleep] : sleep [Rest] : rest [Meds] : meds [Sitting] : sitting [Standing] : standing [Walking] : walking [FreeTextEntry1] : 9/29/22: Pain started again on Saturday and is in the center of the lower back and radiates into the right buttock. \par \par 09/12/2022: s/p L4-5 LESI on 8/22/22 with 90% relief and improvement of ADLs. Still gets dull pain when standing and washing dishes. \par \par 07/26/2022: pt has pain in b/l lower back which radiates occasionally to the b/l groin described as a sharp pain. Takes tylenol PRN.\par \par 10/4/21: s/p LEFT L4-5, L5-S1 TFESI on 9/20/21 with 50% relief and improvement of ADLs. No longer needs to walk with a cane. Continues to use Tylenol PRN. Pt aware of cyst in kidney and sees a urologist. \par \par 8/30/21: Pain started 2 weeks ago and is on the left side of the lower back and radiates into the left buttock described as a sharp pain. \par \par 7.6.21: s/p L4-5 LESI on 6.21.21 with 75-80% relief of pain and improvement of ADLs. Still has some pain in the center of the lower back but much relieved. \par \par 6.8.21: Pain is across the lower back and radiates into the b/l buttocks and radiates down the legs to the toes with some associated numbness. Pain no longer radiates down the legs but still across the lower back. \par \par 11.18.19: s/p L4-5 LESI on 11.7.19 with 90-95% relief of pain and improvement of ADLs. Pt is feeling great and is able to walk better without pain. \par \par 10.23.19: s/p L4-5 LESI on 10.3.19 with 75% relief of pain and improvement of ADLs. Able to walk and get into the car with less pain. Radiation down the left leg is rare/occasional. \par \par 9/3/19: Has been doing PT with some relief but still has pain across the lower back and radiating down the left leg. Takes Tylenol for the pain. \par \par Initial HPI:\par Pain started 4/8/19 and is across the lower back and radiates into the left leg to the big toe. Pain is now improved and just has some dull pain in the lower back. Has been doing PT and completed MDP x2. Allergic to NSAIDs.  [] : no [FreeTextEntry6] : stiffness [FreeTextEntry7] : right buttock  [FreeTextEntry9] : tylenol [de-identified] : l mri

## 2022-09-29 NOTE — DISCUSSION/SUMMARY
[de-identified] : After discussing various treatment options with the patient including but not limited to oral medications, physical therapy, exercise modalities as well as interventional spinal injections, we have decided with the following plan:\par \par - Continue Home exercises, stretching, activity modification, physical therapy, and conservative care.\par - MRI report and/or images was reviewed and discussed with the patient.\par - Recommend First Diagnostic Bilateral L3,L4,L5 Medial Branch Blocks under fluoroscopic guidance with image.\par - Patient presents with axial lumbar pain that has not responded to 3 months of conservative therapy including physical therapy or NSAID therapy.  The pain is interfering with activities of daily living and functionality. There is no radicular pain. The pain is exacerbated by facet loading / positive Kemps maneuver which is defined by pain reproduction with extension and rotation of the lumbar spine to the affected side.  The patient has not had a vertebral fusion at the levels of the proposed treatment.  There is no unexplained neurologic deficit.  There is no history of systemic infection, unstable medical condition, bleeding tendency, or local infection.  The injection is being performed to diagnose the facet joint as the source of the individual's pain, in preparation for a radiofrequency ablation. \par - The risks, benefits, contents and alternatives to injection were explained in full to the patient.  Risks outlined include but are not limited to infection, sepsis, bleeding, post-dural puncture headache, nerve damage, temporary increase in pain, syncopal episode, failure to resolve symptoms, allergic reaction, symptom recurrence, and elevation of blood sugar in diabetics. Cortisone may cause immunosuppression.  Patient understands the risks.  All questions were answered.  After discussion of options, patient requested an injection.  Information regarding the injection was given to the patient.  Which medications to stop prior to the injection was explained to the patient as well.\par - Follow up in 1-2 weeks post injection for re-evaluation.\par - Recommend Meloxicam 15mg daily PRN. Potential adverse effects including but not limited to bleeding, ulcers, increased risk of hypertension, heart disease, kidney disease and stroke were discussed with the patient.  Medication would allow patient to be more mobile and perform ADL's.  Will continue to monitor patient and attempt to wean as soon as possible. Will use the lowest dosage possible for the shortest possible period of time.

## 2022-09-29 NOTE — PHYSICAL EXAM
[de-identified] : Constitutional; Appears well, no apparent distress\par Ability to communicate: Normal \par Respiratory: non-labored breathing\par Skin: No rash noted\par Head: Normocephalic, atraumatic\par Neck: no visible thyroid enlargement\par Eyes: Extraocular movements intact\par Neurologic: Alert and oriented x3\par Psychiatric: normal mood, affect and behavior \par \par  [] : ambulation with cane

## 2022-10-20 ENCOUNTER — APPOINTMENT (OUTPATIENT)
Dept: PAIN MANAGEMENT | Facility: CLINIC | Age: 72
End: 2022-10-20
Payer: MEDICARE

## 2022-10-20 PROCEDURE — 64493 INJ PARAVERT F JNT L/S 1 LEV: CPT | Mod: 1L,50

## 2022-10-20 PROCEDURE — J3490M: CUSTOM | Mod: 1L

## 2022-10-20 PROCEDURE — 64494 INJ PARAVERT F JNT L/S 2 LEV: CPT | Mod: 1L,50

## 2022-10-20 PROCEDURE — 62323 NJX INTERLAMINAR LMBR/SAC: CPT | Mod: 1L

## 2022-10-20 NOTE — PROCEDURE
[FreeTextEntry3] : Date of Service: 10/20/2022 \par \par Account: [id number]\par \par Patient: SLOANE SALCEDO \par \par YOB: 1950\par \par Age: 72 year\par \par \par Surgeon:                                                        Marshall Newsome D.O. \par \par Assistant:                                                        None\par \par Pre-Operative Diagnosis:                            Spondylosis of lumbar region without myelopathy or radiculopathy (M47.816)\par \par Post-Operative Diagnosis:                          Same\par \par Procedure:                                                     Right L3, L4, L5 medial branch block \par                                                                          Left L3, L4, L5 medial branch block under fluoroscopic guidance\par \par Anesthesia:                                                     Local with MAC\par \par \par This procedure was carried out using fluoroscopic guidance.  The risks and benefits of the procedure were discussed extensively with the patient.  The consent of the patient was obtained and the following procedure was performed.\par \par The patient was placed in the prone position.  The patient's back was prepped and draped in a sterile fashion. A timeout was performed with all essential staff present and the site and side were verified. The L4 and L5 lumbar vertebral bodies were identified and the fluoroscope left obliqued to approximately 30 degrees to reveal good "serina-dog" anatomical view.  The junction of the superior articulate process and transverse process at the L4 and L5 and sacral ala level was then identified and marked. The skin at these target points was then localized using 1 cc of 1% lidocaine without epinephrine at each injection site.  A spinal needle was then introduced and advanced to the above target points at the junction of the SAP and transverse processes and sacral ala until bone was contacted.  After negative aspiration for heme and CSF, an injectate of 1.5 cc 0.25% Bupivacaine plus 1 mg of betamethasone was injected at each of the three levels. \par \par The L4 and L5 lumbar vertebral bodies were identified and the fluoroscope right obliqued to approximately 30 degrees to reveal good "serina-dog" anatomical view.  The junction of the superior articulate process and transverse process at the L4 and L5 and sacral ala level was then identified and marked. The skin at these target points was then localized using 1 cc of 1% lidocaine without epinephrine at each injection site.  A spinal needle was then introduced and advanced to the above target points at the junction of the SAP and transverse processes and sacral ala until bone was contacted.  After negative aspiration for heme and CSF, an injectate of 1.5 cc 0.25% bupivacaine plus 1 mg of betamethasone was injected at each of the three levels. \par \par The needles were then removed and pressure was applied.  Anesthesia personnel were present throughout the procedure, monitoring vitals which were stable throughout.\par \par Marshall Newsome D.O.\par

## 2022-10-31 ENCOUNTER — APPOINTMENT (OUTPATIENT)
Dept: PAIN MANAGEMENT | Facility: CLINIC | Age: 72
End: 2022-10-31

## 2022-10-31 VITALS — HEIGHT: 61 IN | BODY MASS INDEX: 32.1 KG/M2 | WEIGHT: 170 LBS

## 2022-10-31 PROCEDURE — 99214 OFFICE O/P EST MOD 30 MIN: CPT

## 2022-10-31 NOTE — PHYSICAL EXAM
[Extension] : extension [de-identified] : Constitutional; Appears well, no apparent distress\par Ability to communicate: Normal \par Respiratory: non-labored breathing\par Skin: No rash noted\par Head: Normocephalic, atraumatic\par Neck: no visible thyroid enlargement\par Eyes: Extraocular movements intact\par Neurologic: Alert and oriented x3\par Psychiatric: normal mood, affect and behavior \par \par  [] : non-antalgic

## 2022-10-31 NOTE — DISCUSSION/SUMMARY
[de-identified] : After discussing various treatment options with the patient including but not limited to oral medications, physical therapy, exercise modalities as well as interventional spinal injections, we have decided with the following plan:\par \par - Continue Home exercises, stretching, activity modification, physical therapy, and conservative care.\par - MRI report and/or images was reviewed and discussed with the patient.\par - Recommend Second Diagnostic Bilateral L3,L4,L5 Medial Branch Blocks under fluoroscopic guidance with image. First diagnostic MBBs resulted in >80% relief and improvement of ADLs for the duration of the local anesthetic \par - Patient presents with axial lumbar pain that has not responded to 3 months of conservative therapy including physical therapy or NSAID therapy.  The pain is interfering with activities of daily living and functionality. There is no radicular pain. The pain is exacerbated by facet loading / positive Kemps maneuver which is defined by pain reproduction with extension and rotation of the lumbar spine to the affected side.  The patient has not had a vertebral fusion at the levels of the proposed treatment.  There is no unexplained neurologic deficit.  There is no history of systemic infection, unstable medical condition, bleeding tendency, or local infection.  The injection is being performed to diagnose the facet joint as the source of the individual's pain, in preparation for a radiofrequency ablation. \par - The risks, benefits, contents and alternatives to injection were explained in full to the patient.  Risks outlined include but are not limited to infection, sepsis, bleeding, post-dural puncture headache, nerve damage, temporary increase in pain, syncopal episode, failure to resolve symptoms, allergic reaction, symptom recurrence, and elevation of blood sugar in diabetics. Cortisone may cause immunosuppression.  Patient understands the risks.  All questions were answered.  After discussion of options, patient requested an injection.  Information regarding the injection was given to the patient.  Which medications to stop prior to the injection was explained to the patient as well.\par - Follow up in 1-2 weeks post injection for re-evaluation.\par - Recommend Tylenol 1000mg Q8 hours PRN.\par

## 2022-10-31 NOTE — HISTORY OF PRESENT ILLNESS
[Lower back] : lower back [Dull/Aching] : dull/aching [Localized] : localized [Intermittent] : intermittent [Ice] : ice [Injection therapy] : injection therapy [Standing] : standing [Walking] : walking [2] : 2 [1] : 2 [FreeTextEntry1] : 10/31/2022 s/p first diagnostic B/L L3,L4,L5 MBB on 10/20/2022 with 90% relief and improvement of ADLs. Overall is feeling better since injection. \par \par 09/12/2022: s/p L4-5 LESI on 8/22/22 with 90% relief and improvement of ADLs. Still gets dull pain when standing and washing dishes. \par \par 07/26/2022: pt has pain in b/l lower back which radiates occasionally to the b/l groin described as a sharp pain. Takes tylenol PRN.\par \par 10/4/21: s/p LEFT L4-5, L5-S1 TFESI on 9/20/21 with 50% relief and improvement of ADLs. No longer needs to walk with a cane. Continues to use Tylenol PRN. Pt aware of cyst in kidney and sees a urologist. \par \par 8/30/21: Pain started 2 weeks ago and is on the left side of the lower back and radiates into the left buttock described as a sharp pain. \par \par 7.6.21: s/p L4-5 LESI on 6.21.21 with 75-80% relief of pain and improvement of ADLs. Still has some pain in the center of the lower back but much relieved. \par \par 6.8.21: Pain is across the lower back and radiates into the b/l buttocks and radiates down the legs to the toes with some associated numbness. Pain no longer radiates down the legs but still across the lower back. \par \par 11.18.19: s/p L4-5 LESI on 11.7.19 with 90-95% relief of pain and improvement of ADLs. Pt is feeling great and is able to walk better without pain. \par \par 10.23.19: s/p L4-5 LESI on 10.3.19 with 75% relief of pain and improvement of ADLs. Able to walk and get into the car with less pain. Radiation down the left leg is rare/occasional. \par \par 9/3/19: Has been doing PT with some relief but still has pain across the lower back and radiating down the left leg. Takes Tylenol for the pain. \par \par Initial HPI:\par Pain started 4/8/19 and is across the lower back and radiates into the left leg to the big toe. Pain is now improved and just has some dull pain in the lower back. Has been doing PT and completed MDP x2. Allergic to NSAIDs. \par  [] : no [FreeTextEntry8] : WALKING  [TWNoteComboBox1] : 90%

## 2022-11-08 ENCOUNTER — RX RENEWAL (OUTPATIENT)
Age: 72
End: 2022-11-08

## 2022-11-23 ENCOUNTER — RX RENEWAL (OUTPATIENT)
Age: 72
End: 2022-11-23

## 2022-12-02 ENCOUNTER — APPOINTMENT (OUTPATIENT)
Dept: CARDIOLOGY | Facility: CLINIC | Age: 72
End: 2022-12-02

## 2022-12-02 ENCOUNTER — NON-APPOINTMENT (OUTPATIENT)
Age: 72
End: 2022-12-02

## 2022-12-02 VITALS
OXYGEN SATURATION: 97 % | WEIGHT: 170 LBS | HEIGHT: 61 IN | BODY MASS INDEX: 32.1 KG/M2 | DIASTOLIC BLOOD PRESSURE: 83 MMHG | HEART RATE: 69 BPM | SYSTOLIC BLOOD PRESSURE: 172 MMHG

## 2022-12-02 VITALS — DIASTOLIC BLOOD PRESSURE: 80 MMHG | SYSTOLIC BLOOD PRESSURE: 140 MMHG

## 2022-12-02 PROCEDURE — 99214 OFFICE O/P EST MOD 30 MIN: CPT | Mod: 25

## 2022-12-02 PROCEDURE — 93000 ELECTROCARDIOGRAM COMPLETE: CPT

## 2022-12-02 NOTE — CARDIOLOGY SUMMARY
[de-identified] : 12/2/22  -sinus rhythm at a rate of 70 bpm.  Leftward axis.  Nonspecific diminished voltage in lead V6.  Nonspecific repolarization abnormalities.

## 2022-12-02 NOTE — DISCUSSION/SUMMARY
[FreeTextEntry1] : Ms. Toro has a history of a dyslipidemia and hypertension. Juan Manuel Diaz suspected that the falling episode that she experienced on 8/3/18 actually represented a brief syncopal episode, possibly related to hypotension secondary to thiazide diuretic therapy having been added to her antihypertensive medication regimen. \par She does not report having experienced any recurrent similar episodes since the dosage of the HCTZ was reduced to 12.5 mg every other day at the time of a follow-up visit here on 8/29/18. She has been stable from a cardiac perspective since her previous visit here on 11/24/2021. Specifically, she does not describe having experienced any signs or symptoms to suggest the development of an anginal syndrome or congestive heart failure. Her cardiac examination today is unremarkable. Her blood pressure reading initially was 172/83 and dropped to 140/80 by the end of the visit. Her electrocardiogram today reveals sinus rhythm with a leftward axis, voltage criteria for LVH in lead aVL, nonspecific diminished voltage in lead V6, and nonspecific repolarization abnormalities, essentially unchanged from her previous office tracing, allowing for lead placement variation.\par \par The patient tends to run elevated blood pressure readings at physicians visits, however, a 24-hour ambulatory blood pressure monitoring study performed on 1/20/2021 revealed the average reading to be normal. Therefore, I have instructed the patient to continue her antihypertensive medications as presently prescribed for the time being.\par \par I have reviewed the findings of blood test report of  6/14/22 in detail with the patient today.\par  She anticipates having follow-up blood testing performed in the near future through the office of Dr. Collins for reassessment.\par \par The importance of proper dietary habits, weight loss, and regular exercise was again emphasized to the patient today.\par \par I have asked the patient to call me if she should have any questions or problems pertaining to these matters, and especially if she should experience suspected recurrence of syncope, or any concerning symptoms. I have otherwise asked her to return to the office for follow-up cardiac evaluation and blood pressure reassessment in 6 months, provided she remains clinically stable in the interim. Prior to her next visit with Dr Diaz, I have asked her to have a carotid doppler and her echocardiogram  performed. [EKG obtained to assist in diagnosis and management of assessed problem(s)] : EKG obtained to assist in diagnosis and management of assessed problem(s)

## 2022-12-02 NOTE — HISTORY OF PRESENT ILLNESS
[FreeTextEntry1] : Ms. Vita Toro presented to the office today for follow-up cardiac evaluation. Dr Diaz  last evaluated the patient in the office on .5/24/22.\par \par The patient is a 71-year-old female with a history of hypertension, a dyslipidemia, pre-diabetes, mild mitral regurgitation, mild carotid atherosclerosis, a lumbar disc herniation, vertigo, hypothyroidism, a renal cyst, a left thyroid nodule, hematuria, and seasonal allergies.\par \par At the time of a previous visit here on 1/16/18, Dr Diaz added HCTZ 12.5 mg daily, in an effort to more optimally control the patient"s blood pressure. She experienced an apparent syncopal episode on 8/3/18, and at the time of a follow-up visit here on 8/29/18, Dr Diaz instructed her to reduce the dosage of HCTZ to 12.5 mg every other day. She has not experienced recurrence of syncope since that episode.\par \par At the time of a follow-up visit here on 12/2/20 the patient was exhibiting an elevated blood pressure reading.  Juan Manuel Diaz referred her for a 24-hour ambulatory blood pressure monitoring study, which was performed on 1/20/21, revealing the average reading to be 121/63.\par \par The patient has been doing well from a cardiac symptomatic standpoint since her previous visit here on 5/24/22. Specifically, she does not describe having experienced chest discomfort or dyspnea on exertion in association with her activities. She has not noted orthopnea, paroxysmal nocturnal dyspnea, or lower extremity edema. She has not experienced any episodes of palpitations, presyncope or syncope.\par \par Review of systems is significant for the patient having developed lower back discomfort in April of 2019. She underwent an MRI evaluation on 5/8/19, which she states revealed evidence for a lumbar disc herniation. She consulted with an orthopedist on 5/16/19, and was treated with tapering courses of steroids and epidural injections. She had a routine follow-up mammography and breast sonogram performed on 6/12/20, and reports having been told of having "a suspicious finding" on the left, for which she eventually underwent a lumpectomy procedure on 7/30/20 (benign findings).  She underwent colonoscopy on 5/4/2021, which she states was revealing for diverticulosis.\par Laboratory studies performed on 6/14/22  revealed cholesterol 185, triglycerides 279, HDL 50, and calculated LDL 79.  The liver chemistries were normal.  The BUN and creatinine were 13 and 0.79, respectively.  The potassium level was 4.5.  The glucose level was 101 and the hemoglobin A1c level was 6.1%.  The hemoglobin and hematocrit were 14.3 and 43.7, respectively.  The TSH level was 1.59.  The vitamin D level was 33.2.\par \par A  24-hour ambulatory blood pressure monitoring study performed from 1/20/21 - 1/21/21 revealed the average reading to be 121/63, with 3% of systolic readings being in the elevated range and 0% of diastolic readings being in the elevated range.\par \par Echocardiography performed on 7/19/17 revealed normal cardiac chamber sizes with normal left ventricular wall thickness and wall motion. Left ventricular systolic function was normal, with an estimated ejection fraction of 70%. Minimal aortic regurgitation and mild mitral regurgitation were demonstrated. There was no evidence of pulmonary hypertension.\par \par Carotid artery Doppler testing performed on 7/25/17 revealed mild plaque formation involving the bulbar regions bilaterally. No significant stenoses were demonstrated.  As an incidental finding, a 1.5 cm x 1.3 cm left thyroid nodule was detected.\par \par Previous History:\par \par On 8/3/18, the patient was walking in a store, when she suddenly felt herself falling. She had an apparent brief syncopal episode, noting that she does not specifically recall hitting the floor, however, she did find herself on the floor, having injured her lip. She stayed up immediately, and was asymptomatic. She did not suffer any other significant injuries in association with this episode. She does not recall having experienced any similar previous episodes, nor has she experienced any recurrent episodes.\par \par As far as risk factors for coronary artery disease are concerned, the patient has a history of hypertension and a dyslipidemia, for which she is being treated with medical therapy. She denies a history of diabetes. She denies a history of cigarette smoking. She describes having an immediate family history of premature coronary artery disease, stating that her father suffered "heart attack" at the age of 55.\par \par Past medical/surgical history according to the patient is significant for hypothyroidism, seasonal allergies, vertigo, a benign left thyroid nodule, a liver hemangioma, arthroscopic right knee surgery, bilateral cataract surgeries, and resection of a benign left breast cyst. The patient denies ever having been pregnant.

## 2022-12-06 ENCOUNTER — APPOINTMENT (OUTPATIENT)
Dept: INTERNAL MEDICINE | Facility: CLINIC | Age: 72
End: 2022-12-06

## 2022-12-06 VITALS
BODY MASS INDEX: 32.1 KG/M2 | OXYGEN SATURATION: 98 % | TEMPERATURE: 97.2 F | SYSTOLIC BLOOD PRESSURE: 126 MMHG | RESPIRATION RATE: 14 BRPM | WEIGHT: 170 LBS | HEIGHT: 61 IN | HEART RATE: 76 BPM | DIASTOLIC BLOOD PRESSURE: 84 MMHG

## 2022-12-06 PROCEDURE — 99214 OFFICE O/P EST MOD 30 MIN: CPT

## 2022-12-06 NOTE — ASSESSMENT
[FreeTextEntry1] : HTN- stable on meds\par Hematuria- follow up with urology\par Hyperlipidemia- check lipid today\par follow up with cards.

## 2022-12-07 LAB
ALBUMIN SERPL ELPH-MCNC: 4.7 G/DL
ALP BLD-CCNC: 91 U/L
ALT SERPL-CCNC: 22 U/L
ANION GAP SERPL CALC-SCNC: 13 MMOL/L
AST SERPL-CCNC: 20 U/L
BILIRUB SERPL-MCNC: 0.4 MG/DL
BUN SERPL-MCNC: 14 MG/DL
CALCIUM SERPL-MCNC: 10.1 MG/DL
CHLORIDE SERPL-SCNC: 102 MMOL/L
CHOLEST SERPL-MCNC: 177 MG/DL
CO2 SERPL-SCNC: 26 MMOL/L
CREAT SERPL-MCNC: 0.77 MG/DL
EGFR: 82 ML/MIN/1.73M2
GLUCOSE SERPL-MCNC: 107 MG/DL
HDLC SERPL-MCNC: 49 MG/DL
LDLC SERPL CALC-MCNC: 87 MG/DL
NONHDLC SERPL-MCNC: 128 MG/DL
POTASSIUM SERPL-SCNC: 4.5 MMOL/L
PROT SERPL-MCNC: 7.1 G/DL
SODIUM SERPL-SCNC: 142 MMOL/L
TRIGL SERPL-MCNC: 207 MG/DL

## 2022-12-12 ENCOUNTER — APPOINTMENT (OUTPATIENT)
Dept: PAIN MANAGEMENT | Facility: CLINIC | Age: 72
End: 2022-12-12
Payer: MEDICARE

## 2022-12-12 PROCEDURE — 64493 INJ PARAVERT F JNT L/S 1 LEV: CPT | Mod: 50

## 2022-12-12 PROCEDURE — J3490M: CUSTOM

## 2022-12-12 PROCEDURE — 64494 INJ PARAVERT F JNT L/S 2 LEV: CPT | Mod: 50

## 2022-12-12 NOTE — PROCEDURE
[FreeTextEntry3] : Date of Service: 12/12/2022 \par \par Account: [id number]\par \par Patient: SLOANE SALCEDO \par \par YOB: 1950\par \par Age: 72 year\par \par \par Surgeon:                                                        Marshall Newsome D.O. \par \par Assistant:                                                        None\par \par Pre-Operative Diagnosis:                            Spondylosis of lumbar region without myelopathy or radiculopathy (M47.816)\par \par Post-Operative Diagnosis:                          Same\par \par Procedure:                                                     Right L3, L4, L5 medial branch block \par                                                                          Left L3, L4, L5 medial branch block under fluoroscopic guidance\par \par Anesthesia:                                                     Local with MAC\par \par \par This procedure was carried out using fluoroscopic guidance.  The risks and benefits of the procedure were discussed extensively with the patient.  The consent of the patient was obtained and the following procedure was performed.\par \par The patient was placed in the prone position.  The patient's back was prepped and draped in a sterile fashion. A timeout was performed with all essential staff present and the site and side were verified. The L4 and L5 lumbar vertebral bodies were identified and the fluoroscope left obliqued to approximately 30 degrees to reveal good "serina-dog" anatomical view.  The junction of the superior articulate process and transverse process at the L4 and L5 and sacral ala level was then identified and marked. The skin at these target points was then localized using 1 cc of 1% lidocaine without epinephrine at each injection site.  A spinal needle was then introduced and advanced to the above target points at the junction of the SAP and transverse processes and sacral ala until bone was contacted.  After negative aspiration for heme and CSF, an injectate of 1.5 cc 0.25% Bupivacaine plus 1 mg of betamethasone was injected at each of the three levels. \par \par The L4 and L5 lumbar vertebral bodies were identified and the fluoroscope right obliqued to approximately 30 degrees to reveal good "serina-dog" anatomical view.  The junction of the superior articulate process and transverse process at the L4 and L5 and sacral ala level was then identified and marked. The skin at these target points was then localized using 1 cc of 1% lidocaine without epinephrine at each injection site.  A spinal needle was then introduced and advanced to the above target points at the junction of the SAP and transverse processes and sacral ala until bone was contacted.  After negative aspiration for heme and CSF, an injectate of 1.5 cc 0.25% bupivacaine plus 1 mg of betamethasone was injected at each of the three levels. \par \par The needles were then removed and pressure was applied.  Anesthesia personnel were present throughout the procedure, monitoring vitals which were stable throughout.\par \par Marshall Newsome D.O.\par

## 2022-12-19 ENCOUNTER — APPOINTMENT (OUTPATIENT)
Dept: PAIN MANAGEMENT | Facility: CLINIC | Age: 72
End: 2022-12-19

## 2022-12-19 VITALS — BODY MASS INDEX: 32.1 KG/M2 | HEIGHT: 61 IN | WEIGHT: 170 LBS

## 2022-12-19 DIAGNOSIS — M47.816 SPONDYLOSIS W/OUT MYELOPATHY OR RADICULOPATHY, LUMBAR REGION: ICD-10-CM

## 2022-12-19 DIAGNOSIS — M54.50 LOW BACK PAIN, UNSPECIFIED: ICD-10-CM

## 2022-12-19 PROCEDURE — 99214 OFFICE O/P EST MOD 30 MIN: CPT

## 2022-12-19 NOTE — DISCUSSION/SUMMARY
[de-identified] : After discussing various treatment options with the patient including but not limited to oral medications, physical therapy, exercise modalities as well as interventional spinal injections, we have decided with the following plan:\par \par - Continue Home exercises, stretching, activity modification, physical therapy, and conservative care.\par - MRI report and/or images was reviewed and discussed with the patient.\par - Recommend Bilateral L3,4,5 radiofrequency ablation under under fluoroscopic guidance with image.\par - Patient has lumbosacral axial pain that is consistent with facet joint pathology. Two diagnostic blocks of the medial branch nerves with local anesthetic was performed and has resulted in at least a 80% reduction in pain for the duration of the specific local anesthetic. The pain is not radicular. There is no prior spinal fusion surgery at the level targeted.  The pain has failed to respond to three months of conservative therapy.\par - The risks, benefits, contents and alternatives to injection were explained in full to the patient.  Risks outlined include but are not limited to infection, sepsis, bleeding, post-dural puncture headache, nerve damage, temporary increase in pain, syncopal episode, failure to resolve symptoms, allergic reaction, symptom recurrence, and elevation of blood sugar in diabetics. Cortisone may cause immunosuppression.  Patient understands the risks.  All questions were answered.  After discussion of options, patient requested an injection.  Information regarding the injection was given to the patient.  Which medications to stop prior to the injection was explained to the patient as well.\par - Follow up in 1-2 weeks post injection for re-evaluation.\par - Will call to schedule.\par - Recommend Tylenol 1000mg Q8 hours PRN.\par

## 2022-12-19 NOTE — HISTORY OF PRESENT ILLNESS
[Lower back] : lower back [2] : 2 [1] : 2 [Dull/Aching] : dull/aching [Localized] : localized [Intermittent] : intermittent [Household chores] : household chores [Rest] : rest [Ice] : ice [Injection therapy] : injection therapy [Standing] : standing [Walking] : walking [Retired] : Work status: retired [FreeTextEntry1] : 12/19/2022: s/p second diagnostic B/L L3,4,5 medial branch blocks on 12/12/22 with >90% relief and improvement of ADLs. Pin has been great since injection - still has trouble walking for long periods. Has needed much less tylenol than she usually takes.  \par \par 10/31/2022 s/p first diagnostic B/L L3,L4,L5 MBB on 10/20/2022 with 90% relief and improvement of ADLs. Overall is feeling better since injection. \par \par 09/12/2022: s/p L4-5 LESI on 8/22/22 with 90% relief and improvement of ADLs. Still gets dull pain when standing and washing dishes. \par \par 07/26/2022: pt has pain in b/l lower back which radiates occasionally to the b/l groin described as a sharp pain. Takes tylenol PRN.\par \par 10/4/21: s/p LEFT L4-5, L5-S1 TFESI on 9/20/21 with 50% relief and improvement of ADLs. No longer needs to walk with a cane. Continues to use Tylenol PRN. Pt aware of cyst in kidney and sees a urologist. \par \par 8/30/21: Pain started 2 weeks ago and is on the left side of the lower back and radiates into the left buttock described as a sharp pain. \par \par 7.6.21: s/p L4-5 LESI on 6.21.21 with 75-80% relief of pain and improvement of ADLs. Still has some pain in the center of the lower back but much relieved. \par \par 6.8.21: Pain is across the lower back and radiates into the b/l buttocks and radiates down the legs to the toes with some associated numbness. Pain no longer radiates down the legs but still across the lower back. \par \par 11.18.19: s/p L4-5 LESI on 11.7.19 with 90-95% relief of pain and improvement of ADLs. Pt is feeling great and is able to walk better without pain. \par \par 10.23.19: s/p L4-5 LESI on 10.3.19 with 75% relief of pain and improvement of ADLs. Able to walk and get into the car with less pain. Radiation down the left leg is rare/occasional. \par \par 9/3/19: Has been doing PT with some relief but still has pain across the lower back and radiating down the left leg. Takes Tylenol for the pain. \par \par Initial HPI:\par Pain started 4/8/19 and is across the lower back and radiates into the left leg to the big toe. Pain is now improved and just has some dull pain in the lower back. Has been doing PT and completed MDP x2. Allergic to NSAIDs. \par  [] : no [FreeTextEntry8] : WALKING  [TWNoteComboBox1] : 90%

## 2022-12-19 NOTE — PHYSICAL EXAM
[Extension] : extension [de-identified] : Constitutional; Appears well, no apparent distress\par Ability to communicate: Normal \par Respiratory: non-labored breathing\par Skin: No rash noted\par Head: Normocephalic, atraumatic\par Neck: no visible thyroid enlargement\par Eyes: Extraocular movements intact\par Neurologic: Alert and oriented x3\par Psychiatric: normal mood, affect and behavior \par \par  [] : non-antalgic

## 2023-02-02 ENCOUNTER — RX RENEWAL (OUTPATIENT)
Age: 73
End: 2023-02-02

## 2023-04-20 ENCOUNTER — APPOINTMENT (OUTPATIENT)
Dept: CARDIOLOGY | Facility: CLINIC | Age: 73
End: 2023-04-20
Payer: MEDICARE

## 2023-04-20 PROCEDURE — 93880 EXTRACRANIAL BILAT STUDY: CPT

## 2023-04-20 PROCEDURE — 93306 TTE W/DOPPLER COMPLETE: CPT

## 2023-05-24 ENCOUNTER — NON-APPOINTMENT (OUTPATIENT)
Age: 73
End: 2023-05-24

## 2023-05-24 ENCOUNTER — APPOINTMENT (OUTPATIENT)
Dept: CARDIOLOGY | Facility: CLINIC | Age: 73
End: 2023-05-24
Payer: MEDICARE

## 2023-05-24 VITALS
OXYGEN SATURATION: 98 % | HEIGHT: 61 IN | DIASTOLIC BLOOD PRESSURE: 81 MMHG | HEART RATE: 64 BPM | WEIGHT: 177 LBS | BODY MASS INDEX: 33.42 KG/M2 | SYSTOLIC BLOOD PRESSURE: 157 MMHG

## 2023-05-24 DIAGNOSIS — I65.23 OCCLUSION AND STENOSIS OF BILATERAL CAROTID ARTERIES: ICD-10-CM

## 2023-05-24 DIAGNOSIS — H40.053 OCULAR HYPERTENSION, BILATERAL: ICD-10-CM

## 2023-05-24 PROCEDURE — 93000 ELECTROCARDIOGRAM COMPLETE: CPT

## 2023-05-24 PROCEDURE — 99215 OFFICE O/P EST HI 40 MIN: CPT | Mod: 25

## 2023-05-24 NOTE — CARDIOLOGY SUMMARY
[de-identified] : 5/24/23 - Sinus rhythm at a rate of 65 bpm.  Leftward axis.  Voltage criteria for LVH.  Nonspecific diminished voltage in leads V5-V6.  Nonspecific ST-T wave abnormalities.
No

## 2023-05-24 NOTE — HISTORY OF PRESENT ILLNESS
[FreeTextEntry1] : Ms. Vita Toro presented to the office today for follow-up cardiac evaluation.  She was last evaluated in our office on 12/2/2022 by Katherine Rhodes NP.\par \par The patient is a 72-year-old female with a history of hypertension, a dyslipidemia, pre-diabetes, mild mitral regurgitation, mild carotid atherosclerosis, a lumbar disc herniation, vertigo, hypothyroidism, a renal cyst, a left thyroid nodule, hematuria, bilateral increased intraocular pressure, and seasonal allergies.\par \par The patient has been doing well from a cardiac symptomatic standpoint since her previous visit here on 12/2/2022. Specifically, she does not describe having experienced chest discomfort or dyspnea on exertion in association with her activities. She has not noted orthopnea, paroxysmal nocturnal dyspnea, or lower extremity edema. She has not experienced any episodes of palpitations, presyncope or syncope.\par At the time of a previous visit here on 1/16/18, I added HCTZ 12.5 mg daily, in an effort to more optimally control the patient"s blood pressure. She experienced an apparent syncopal episode on 8/3/18, and at the time of a follow-up visit here on 8/29/18, I instructed her to reduce the dosage of HCTZ to 12.5 mg every other day. She has not experienced recurrence of syncope since that episode.\par \par At the time of a follow-up visit here on 12/2/20 the patient was exhibiting an elevated blood pressure reading.  I referred her for a 24-hour ambulatory blood pressure monitoring study, which was performed on 1/20/21, revealing the average reading to be 121/63.\par \par Review of systems is significant for the patient having developed lower back discomfort in April of 2019. She underwent an MRI evaluation on 5/8/19, which she states revealed evidence for a lumbar disc herniation. She consulted with an orthopedist on 5/16/19, and was treated with tapering courses of steroids and epidural injections. She had a routine follow-up mammography and breast sonogram performed on 6/12/20, and reports having been told of having "a suspicious finding" on the left, for which she eventually underwent a lumpectomy procedure on 7/30/20 (benign findings).  She underwent colonoscopy on 5/4/2021, which she states was revealing for diverticulosis.\par \par Laboratory studies performed on 12/6/2022 revealed cholesterol 177, triglycerides 207, HDL 49, and calculated LDL 87.  The liver chemistries were normal.  The BUN and creatinine were 14 and 0.77, respectively.  The potassium level was 4.5.  The glucose level was 107 and the hemoglobin A1c level was 6.1%.  The hemoglobin and hematocrit were 14.3 and 43.7, respectively.\par \par A  24-hour ambulatory blood pressure monitoring study performed from 1/20/21 - 1/21/21 revealed the average reading to be 121/63, with 3% of systolic readings being in the elevated range and 0% of diastolic readings being in the elevated range.\par \par Echocardiography most recently performed on 4/20/2023 revealed normal cardiac chamber sizes with normal left ventricular wall thickness and wall motion.  Left ventricular systolic function was normal, with an estimated ejection fraction of 60%.  Impaired diastolic relaxation of the left ventricle was demonstrated.  Mild aortic regurgitation, mild mitral regurgitation, mild tricuspid regurgitation were demonstrated, without evidence of pulmonary hypertension.\par \par Carotid artery Doppler testing most recently performed on 4/20/2023 revealed mild plaque formation involving the bulbar regions bilaterally. No significant stenoses were demonstrated.  As an incidental finding, a 1.5 cm x 1.3 cm left thyroid nodule was detected.\par \par Previous History:\par \par On 8/3/18, the patient was walking in a store, when she suddenly felt herself falling. She had an apparent brief syncopal episode, noting that she does not specifically recall hitting the floor, however, she did find herself on the floor, having injured her lip. She stayed up immediately, and was asymptomatic. She did not suffer any other significant injuries in association with this episode. She does not recall having experienced any similar previous episodes, nor has she experienced any recurrent episodes.\par \par As far as risk factors for coronary artery disease are concerned, the patient has a history of hypertension and a dyslipidemia, for which she is being treated with medical therapy. She denies a history of diabetes. She denies a history of cigarette smoking. She describes having an immediate family history of premature coronary artery disease, stating that her father suffered "heart attack" at the age of 55.\par \par Past medical/surgical history according to the patient is significant for hypothyroidism, seasonal allergies, vertigo, a benign left thyroid nodule, a liver hemangioma, arthroscopic right knee surgery, bilateral cataract surgeries, and resection of a benign left breast cyst. The patient denies ever having been pregnant.

## 2023-06-05 ENCOUNTER — APPOINTMENT (OUTPATIENT)
Dept: ORTHOPEDIC SURGERY | Facility: CLINIC | Age: 73
End: 2023-06-05
Payer: MEDICARE

## 2023-06-05 PROCEDURE — 20610 DRAIN/INJ JOINT/BURSA W/O US: CPT | Mod: 50

## 2023-06-05 PROCEDURE — 99214 OFFICE O/P EST MOD 30 MIN: CPT | Mod: 25

## 2023-06-05 PROCEDURE — 99213 OFFICE O/P EST LOW 20 MIN: CPT | Mod: 25

## 2023-06-05 PROCEDURE — 73562 X-RAY EXAM OF KNEE 3: CPT | Mod: 50

## 2023-06-05 NOTE — HISTORY OF PRESENT ILLNESS
[Left Leg] : left leg [Right Arm] : right arm [Gradual] : gradual [Sudden] : sudden [7] : 7 [6] : 6 [Burning] : burning [Shooting] : shooting [Stabbing] : stabbing [Intermittent] : intermittent [Rest] : rest [Exercising] : exercising [Retired] : Work status: retired [de-identified] : 06/05/23:  Return visit for a 72 year old female here today for complaint of recurrent bilateral knee pain and known osteoarthritis.  Previous cortisone injections both knees two years ago. had a flare up of her rt knee pain x 4 days ago.\par \par 05/20/21: Return visit for a 70 year old female here today with recurrent bilateral knee pain with the left side since March of this year, and her right knee which is not as painful. Previous cortisone injections in November of 2019 which helped. Had a series of Euflexxa injections rt knee in 7/19.\par \par Also complains of lower back pain that began on May 2 and intensified after a colonoscopy on May 4 into May 5 with severe pain that woke her. Was diagnosed with diverticulosis and put on an antibiotic. Is scheduled to see Dr. Newsome and has a history of back pain.\par \par 11/21/19 Returns today c/o recurring bilateral knee pain rt > lt x last few weeks duration. Finished Euflexxa injections rt knee x 4 months ago which helped. Would like cortisone injections.\par \par 8/27/19 returns today still c/o rt knee pain although better since finishing Euflexxa injections.Takes Tylenol XS prn.Having recurring LBP and bilateral leg pain. Scheduled to see pain management.Still in PT for both knee and lower back.\par \par 7/16/19: Pt presents for Euflexxa Injection #3 right knee. Still in pain. Has been icing.\par \par 7/9/19 Here for Euflexxa injection #2 rt knee.\par \par 7/2/19 F/U R knee pain reoccurring. Cortisone injection 5/9/19 helped temporarily. Did visco 9 years ago w/ relief. Pain worse with walking, standing, sitting, sleeping. \par \par 5/28/19 Here for MRI rt knee results.feeling 70% better after cortisone injection x 3 weeks ago. In PT 1-2x/week.\par \par 5/9/19 Returns today c/o persistent rt knee pain. Had no relief from last injection.\par \par 3/5/19 return visit for this 67 yo female c/o spon. onset of rt knee pain x last3 days duration. Constant and daily. Slight limp. Taking Tylenol XS w/o relief.Worse descending stairs w/ both knees.Cannot take nsaids due to facial swelling??\par PMH; 7/2010 :s/p arthroscopic menisectomy rt knee and gel injections postop.Did well until now. [] : Post Surgical Visit: no [FreeTextEntry1] : B/L knees  [FreeTextEntry5] : Pt has a hx of OA in B/L knees and has had gel inj in the past with relief and has been having a gradual opnset of B/L knee pain for the past few weeks with no injury  [de-identified] : none  [de-identified] : 2021

## 2023-06-05 NOTE — PHYSICAL EXAM
[Normal Mood and Affect] : normal mood and affect [Able to Communicate] : able to communicate [Well Developed] : well developed [Well Nourished] : well nourished [5___] : hamstring 5[unfilled]/5 [Left] : left knee [NL (0)] : extension 0 degrees [Right] : right knee [AP] : anteroposterior [Lateral] : lateral [[place]] : skyline [advanced tricompartmental OA with medial compartment narrowing and varus alignment] : advanced tricompartmental OA with medial compartment narrowing and varus alignment [] : non-antalgic [FreeTextEntry9] : Moderate medial joint space narrowing on left [TWNoteComboBox7] : flexion 130 degrees

## 2023-06-15 ENCOUNTER — APPOINTMENT (OUTPATIENT)
Dept: INTERNAL MEDICINE | Facility: CLINIC | Age: 73
End: 2023-06-15
Payer: MEDICARE

## 2023-06-15 VITALS
RESPIRATION RATE: 15 BRPM | HEIGHT: 61 IN | OXYGEN SATURATION: 97 % | BODY MASS INDEX: 33.04 KG/M2 | WEIGHT: 175 LBS | DIASTOLIC BLOOD PRESSURE: 80 MMHG | SYSTOLIC BLOOD PRESSURE: 160 MMHG | HEART RATE: 72 BPM | TEMPERATURE: 97.4 F

## 2023-06-15 DIAGNOSIS — R03.0 ELEVATED BLOOD-PRESSURE READING, W/OUT DIAGNOSIS OF HYPERTENSION: ICD-10-CM

## 2023-06-15 PROCEDURE — G0439: CPT

## 2023-06-15 NOTE — ASSESSMENT
[FreeTextEntry1] : HTN- continue HCT and Olmesartan 40mg daily, Continue metoprolol 100mg daily\par Hyperlipidemia- stable on Meds. \par Hypothyroidism- Synthroid 88mcg daily ( followed by Dr. Huerta)

## 2023-06-15 NOTE — HISTORY OF PRESENT ILLNESS
[de-identified] : Followed by urology for blood in urine ( Dr. Guzmán)\par Followed by breast surgeon ( Dr. Mcnally) \par

## 2023-06-15 NOTE — HEALTH RISK ASSESSMENT
[Good] : ~his/her~  mood as  good [0] : 2) Feeling down, depressed, or hopeless: Not at all (0) [Patient reported mammogram was normal] : Patient reported mammogram was normal [Patient reported PAP Smear was normal] : Patient reported PAP Smear was normal [Patient reported colonoscopy was normal] : Patient reported colonoscopy was normal [MammogramDate] : 02/23 [PapSmearDate] : 06/23 [ColonoscopyDate] : 05/22

## 2023-06-16 LAB
25(OH)D3 SERPL-MCNC: 42.8 NG/ML
ALBUMIN SERPL ELPH-MCNC: 4.8 G/DL
ALP BLD-CCNC: 94 U/L
ALT SERPL-CCNC: 21 U/L
ANION GAP SERPL CALC-SCNC: 12 MMOL/L
AST SERPL-CCNC: 15 U/L
BILIRUB SERPL-MCNC: 0.5 MG/DL
BUN SERPL-MCNC: 19 MG/DL
CALCIUM SERPL-MCNC: 10.1 MG/DL
CHLORIDE SERPL-SCNC: 102 MMOL/L
CHOLEST SERPL-MCNC: 194 MG/DL
CO2 SERPL-SCNC: 29 MMOL/L
CREAT SERPL-MCNC: 0.8 MG/DL
EGFR: 78 ML/MIN/1.73M2
ESTIMATED AVERAGE GLUCOSE: 131 MG/DL
FOLATE SERPL-MCNC: >20 NG/ML
GLUCOSE SERPL-MCNC: 99 MG/DL
HBA1C MFR BLD HPLC: 6.2 %
HDLC SERPL-MCNC: 63 MG/DL
LDLC SERPL CALC-MCNC: 83 MG/DL
NONHDLC SERPL-MCNC: 131 MG/DL
POTASSIUM SERPL-SCNC: 4.4 MMOL/L
PROT SERPL-MCNC: 7.2 G/DL
SODIUM SERPL-SCNC: 143 MMOL/L
TRIGL SERPL-MCNC: 241 MG/DL
VIT B12 SERPL-MCNC: 888 PG/ML

## 2023-06-19 ENCOUNTER — RX RENEWAL (OUTPATIENT)
Age: 73
End: 2023-06-19

## 2023-07-17 ENCOUNTER — APPOINTMENT (OUTPATIENT)
Dept: ORTHOPEDIC SURGERY | Facility: CLINIC | Age: 73
End: 2023-07-17
Payer: MEDICARE

## 2023-07-17 VITALS — WEIGHT: 175 LBS | BODY MASS INDEX: 33.04 KG/M2 | HEIGHT: 61 IN

## 2023-07-17 DIAGNOSIS — I10 ESSENTIAL (PRIMARY) HYPERTENSION: ICD-10-CM

## 2023-07-17 PROCEDURE — 99213 OFFICE O/P EST LOW 20 MIN: CPT | Mod: 25

## 2023-07-17 PROCEDURE — 20610 DRAIN/INJ JOINT/BURSA W/O US: CPT | Mod: 50

## 2023-07-17 NOTE — PHYSICAL EXAM
[Normal Mood and Affect] : normal mood and affect [Able to Communicate] : able to communicate [Well Developed] : well developed [Well Nourished] : well nourished [5___] : hamstring 5[unfilled]/5 [Left] : left knee [NL (0)] : extension 0 degrees [Right] : right knee [AP] : anteroposterior [Lateral] : lateral [[place]] : skyline [advanced tricompartmental OA with medial compartment narrowing and varus alignment] : advanced tricompartmental OA with medial compartment narrowing and varus alignment [] : non-antalgic [FreeTextEntry9] : Moderate medial joint space narrowing on left [TWNoteComboBox7] : flexion 130 degrees

## 2023-07-17 NOTE — PROCEDURE
[Large Joint Injection] : Large joint injection [Bilateral] : bilaterally of the [Knee] : knee [Synvisc (16mg)] : 16mg of Synvisc [#1] : series #1 [] : Patient tolerated procedure well [Call if redness, pain or fever occur] : call if redness, pain or fever occur [Apply ice for 15min out of every hour for the next 12-24 hours as tolerated] : apply ice for 15 minutes out of every hour for the next 12-24 hours as tolerated [Risks, benefits, alternatives discussed / Verbal consent obtained] : the risks benefits, and alternatives have been discussed, and verbal consent was obtained

## 2023-07-17 NOTE — HISTORY OF PRESENT ILLNESS
[4] : 4 [0] : 0 [Intermittent] : intermittent [Rest] : rest [de-identified] : 07/17/23:  Patient returns today to begin Synvisc injections, both knees. \par \par 06/05/23:  Return visit for a 72 year old female here today for complaint of recurrent bilateral knee pain and known osteoarthritis.  Previous cortisone injections both knees two years ago. had a flare up of her rt knee pain x 4 days ago.\par \par 05/20/21: Return visit for a 70 year old female here today with recurrent bilateral knee pain with the left side since March of this year, and her right knee which is not as painful. Previous cortisone injections in November of 2019 which helped. Had a series of Euflexxa injections rt knee in 7/19.\par \par Also complains of lower back pain that began on May 2 and intensified after a colonoscopy on May 4 into May 5 with severe pain that woke her. Was diagnosed with diverticulosis and put on an antibiotic. Is scheduled to see Dr. Newsome and has a history of back pain.\par \par 11/21/19 Returns today c/o recurring bilateral knee pain rt > lt x last few weeks duration. Finished Euflexxa injections rt knee x 4 months ago which helped. Would like cortisone injections.\par \par 8/27/19 returns today still c/o rt knee pain although better since finishing Euflexxa injections.Takes Tylenol XS prn.Having recurring LBP and bilateral leg pain. Scheduled to see pain management.Still in PT for both knee and lower back.\par \par 7/16/19: Pt presents for Euflexxa Injection #3 right knee. Still in pain. Has been icing.\par \par 7/9/19 Here for Euflexxa injection #2 rt knee.\par \par 7/2/19 F/U R knee pain reoccurring. Cortisone injection 5/9/19 helped temporarily. Did visco 9 years ago w/ relief. Pain worse with walking, standing, sitting, sleeping. \par \par 5/28/19 Here for MRI rt knee results.feeling 70% better after cortisone injection x 3 weeks ago. In PT 1-2x/week.\par \par 5/9/19 Returns today c/o persistent rt knee pain. Had no relief from last injection.\par \par 3/5/19 return visit for this 69 yo female c/o spon. onset of rt knee pain x last3 days duration. Constant and daily. Slight limp. Taking Tylenol XS w/o relief.Worse descending stairs w/ both knees.Cannot take nsaids due to facial swelling??\par PMH; 7/2010 :s/p arthroscopic menisectomy rt knee and gel injections postop.Did well until now. [] : no [FreeTextEntry1] : bilateral knees [de-identified] : dr marrero [de-identified] : none

## 2023-07-17 NOTE — DISCUSSION/SUMMARY
[de-identified] : "Written by Filomena Pablo, acting as Scribe for Caio Carrasco MD."\par \par Dr. Carrasco - \par The documentation recorded by the scribe accurately reflects the service I personally performed and the decisions made by me.

## 2023-07-24 ENCOUNTER — APPOINTMENT (OUTPATIENT)
Dept: ORTHOPEDIC SURGERY | Facility: CLINIC | Age: 73
End: 2023-07-24
Payer: MEDICARE

## 2023-07-24 PROCEDURE — 20610 DRAIN/INJ JOINT/BURSA W/O US: CPT | Mod: 50

## 2023-07-24 NOTE — PHYSICAL EXAM
[Normal Mood and Affect] : normal mood and affect [Able to Communicate] : able to communicate [Well Developed] : well developed [Well Nourished] : well nourished [5___] : hamstring 5[unfilled]/5 [Left] : left knee [NL (0)] : extension 0 degrees [Right] : right knee [AP] : anteroposterior [Lateral] : lateral [[place]] : skyline [advanced tricompartmental OA with medial compartment narrowing and varus alignment] : advanced tricompartmental OA with medial compartment narrowing and varus alignment [] : non-antalgic [FreeTextEntry9] : Moderate medial joint space narrowing on left [TWNoteComboBox7] : flexion 130 degrees

## 2023-07-24 NOTE — DISCUSSION/SUMMARY
[de-identified] : "Written by Filomena Pablo, acting as Scribe for Caio Carrasco MD."\par \par Dr. Carrasco - \par The documentation recorded by the scribe accurately reflects the service I personally performed and the decisions made by me.

## 2023-07-24 NOTE — HISTORY OF PRESENT ILLNESS
[4] : 4 [0] : 0 [Intermittent] : intermittent [Rest] : rest [Gradual] : gradual [Sudden] : sudden [Burning] : burning [Shooting] : shooting [Stabbing] : stabbing [Retired] : Work status: retired [2] : 2 [Synvisc] : Synvisc [de-identified] : 07/24/23:  Here today for Synvisc injections #2, both knees.  \par \par 07/17/23:  Patient returns today to begin Synvisc injections, both knees. \par \par 06/05/23:  Return visit for a 72 year old female here today for complaint of recurrent bilateral knee pain and known osteoarthritis.  Previous cortisone injections both knees two years ago. had a flare up of her rt knee pain x 4 days ago.\par \par 05/20/21: Return visit for a 70 year old female here today with recurrent bilateral knee pain with the left side since March of this year, and her right knee which is not as painful. Previous cortisone injections in November of 2019 which helped. Had a series of Euflexxa injections rt knee in 7/19.\par \par Also complains of lower back pain that began on May 2 and intensified after a colonoscopy on May 4 into May 5 with severe pain that woke her. Was diagnosed with diverticulosis and put on an antibiotic. Is scheduled to see Dr. Newsome and has a history of back pain.\par \par 11/21/19 Returns today c/o recurring bilateral knee pain rt > lt x last few weeks duration. Finished Euflexxa injections rt knee x 4 months ago which helped. Would like cortisone injections.\par \par 8/27/19 returns today still c/o rt knee pain although better since finishing Euflexxa injections.Takes Tylenol XS prn.Having recurring LBP and bilateral leg pain. Scheduled to see pain management.Still in PT for both knee and lower back.\par \par 7/16/19: Pt presents for Euflexxa Injection #3 right knee. Still in pain. Has been icing.\par \par 7/9/19 Here for Euflexxa injection #2 rt knee.\par \par 7/2/19 F/U R knee pain reoccurring. Cortisone injection 5/9/19 helped temporarily. Did visco 9 years ago w/ relief. Pain worse with walking, standing, sitting, sleeping. \par \par 5/28/19 Here for MRI rt knee results.feeling 70% better after cortisone injection x 3 weeks ago. In PT 1-2x/week.\par \par 5/9/19 Returns today c/o persistent rt knee pain. Had no relief from last injection.\par \par 3/5/19 return visit for this 67 yo female c/o spon. onset of rt knee pain x last3 days duration. Constant and daily. Slight limp. Taking Tylenol XS w/o relief.Worse descending stairs w/ both knees.Cannot take nsaids due to facial swelling??\par PMH; 7/2010 :s/p arthroscopic menisectomy rt knee and gel injections postop.Did well until now. [] : Post Surgical Visit: no [FreeTextEntry1] : bilateral knees [de-identified] : dr marrero [de-identified] : none

## 2023-07-24 NOTE — PROCEDURE
[Large Joint Injection] : Large joint injection [Bilateral] : bilaterally of the [Knee] : knee [Pain] : pain [X-ray evidence of Osteoarthritis on this or prior visit] : x-ray evidence of Osteoarthritis on this or prior visit [Betadine] : betadine [Ethyl Chloride sprayed topically] : ethyl chloride sprayed topically [Synvisc (16mg)] : 16mg of Synvisc [#2] : series #2 [] : Patient tolerated procedure well [Call if redness, pain or fever occur] : call if redness, pain or fever occur [Apply ice for 15min out of every hour for the next 12-24 hours as tolerated] : apply ice for 15 minutes out of every hour for the next 12-24 hours as tolerated [Risks, benefits, alternatives discussed / Verbal consent obtained] : the risks benefits, and alternatives have been discussed, and verbal consent was obtained

## 2023-08-01 ENCOUNTER — APPOINTMENT (OUTPATIENT)
Dept: ORTHOPEDIC SURGERY | Facility: CLINIC | Age: 73
End: 2023-08-01
Payer: MEDICARE

## 2023-08-01 PROCEDURE — 20610 DRAIN/INJ JOINT/BURSA W/O US: CPT | Mod: 50

## 2023-08-01 NOTE — PHYSICAL EXAM
[Normal Mood and Affect] : normal mood and affect [Able to Communicate] : able to communicate [Well Developed] : well developed [Well Nourished] : well nourished [5___] : hamstring 5[unfilled]/5 [Left] : left knee [NL (0)] : extension 0 degrees [] : patient ambulates without assistive device [Right] : right knee [AP] : anteroposterior [Lateral] : lateral [[place]] : skyline [advanced tricompartmental OA with medial compartment narrowing and varus alignment] : advanced tricompartmental OA with medial compartment narrowing and varus alignment [FreeTextEntry9] : Moderate medial joint space narrowing on left [TWNoteComboBox7] : flexion 130 degrees

## 2023-08-01 NOTE — HISTORY OF PRESENT ILLNESS
[Gradual] : gradual [Sudden] : sudden [4] : 4 [0] : 0 [Burning] : burning [Shooting] : shooting [Stabbing] : stabbing [Intermittent] : intermittent [Rest] : rest [Retired] : Work status: retired [2] : 2 [Synvisc] : Synvisc [de-identified] : 08/01/23:  Here today for Synvisc #3, both knees.   07/24/23:  Here today for Synvisc injections #2, both knees.    07/17/23:  Patient returns today to begin Synvisc injections, both knees.   06/05/23:  Return visit for a 72 year old female here today for complaint of recurrent bilateral knee pain and known osteoarthritis.  Previous cortisone injections both knees two years ago. had a flare up of her rt knee pain x 4 days ago.  05/20/21: Return visit for a 70 year old female here today with recurrent bilateral knee pain with the left side since March of this year, and her right knee which is not as painful. Previous cortisone injections in November of 2019 which helped. Had a series of Euflexxa injections rt knee in 7/19.  Also complains of lower back pain that began on May 2 and intensified after a colonoscopy on May 4 into May 5 with severe pain that woke her. Was diagnosed with diverticulosis and put on an antibiotic. Is scheduled to see Dr. Newsome and has a history of back pain.  11/21/19 Returns today c/o recurring bilateral knee pain rt > lt x last few weeks duration. Finished Euflexxa injections rt knee x 4 months ago which helped. Would like cortisone injections.  8/27/19 returns today still c/o rt knee pain although better since finishing Euflexxa injections.Takes Tylenol XS prn.Having recurring LBP and bilateral leg pain. Scheduled to see pain management.Still in PT for both knee and lower back.  7/16/19: Pt presents for Euflexxa Injection #3 right knee. Still in pain. Has been icing.  7/9/19 Here for Euflexxa injection #2 rt knee.  7/2/19 F/U R knee pain reoccurring. Cortisone injection 5/9/19 helped temporarily. Did visco 9 years ago w/ relief. Pain worse with walking, standing, sitting, sleeping.   5/28/19 Here for MRI rt knee results.feeling 70% better after cortisone injection x 3 weeks ago. In PT 1-2x/week.  5/9/19 Returns today c/o persistent rt knee pain. Had no relief from last injection.  3/5/19 return visit for this 69 yo female c/o spon. onset of rt knee pain x last3 days duration. Constant and daily. Slight limp. Taking Tylenol XS w/o relief.Worse descending stairs w/ both knees.Cannot take nsaids due to facial swelling?? PMH; 7/2010 :s/p arthroscopic menisectomy rt knee and gel injections postop.Did well until now. [] : Post Surgical Visit: no [FreeTextEntry1] : bilateral knees [de-identified] : dr marrero [de-identified] : none

## 2023-08-01 NOTE — PROCEDURE
[Large Joint Injection] : Large joint injection [Bilateral] : bilaterally of the [Knee] : knee [Pain] : pain [X-ray evidence of Osteoarthritis on this or prior visit] : x-ray evidence of Osteoarthritis on this or prior visit [Betadine] : betadine [Ethyl Chloride sprayed topically] : ethyl chloride sprayed topically [Synvisc (16mg)] : 16mg of Synvisc [#3] : series #3 [] : Patient tolerated procedure well [Call if redness, pain or fever occur] : call if redness, pain or fever occur [Apply ice for 15min out of every hour for the next 12-24 hours as tolerated] : apply ice for 15 minutes out of every hour for the next 12-24 hours as tolerated [Risks, benefits, alternatives discussed / Verbal consent obtained] : the risks benefits, and alternatives have been discussed, and verbal consent was obtained

## 2023-08-01 NOTE — DISCUSSION/SUMMARY
[de-identified] : "Written by Filomena Pablo, acting as Scribe for Caio Carrasco MD."\par  \par  Dr. Carrasco - \par  The documentation recorded by the scribe accurately reflects the service I personally performed and the decisions made by me.

## 2023-09-15 ENCOUNTER — RX RENEWAL (OUTPATIENT)
Age: 73
End: 2023-09-15

## 2023-09-18 ENCOUNTER — RX RENEWAL (OUTPATIENT)
Age: 73
End: 2023-09-18

## 2023-10-24 ENCOUNTER — APPOINTMENT (OUTPATIENT)
Dept: ULTRASOUND IMAGING | Facility: CLINIC | Age: 73
End: 2023-10-24
Payer: MEDICARE

## 2023-10-24 ENCOUNTER — APPOINTMENT (OUTPATIENT)
Dept: RADIOLOGY | Facility: CLINIC | Age: 73
End: 2023-10-24
Payer: MEDICARE

## 2023-10-24 PROCEDURE — 76775 US EXAM ABDO BACK WALL LIM: CPT

## 2023-10-24 PROCEDURE — 74018 RADEX ABDOMEN 1 VIEW: CPT

## 2023-11-22 ENCOUNTER — APPOINTMENT (OUTPATIENT)
Dept: CARDIOLOGY | Facility: CLINIC | Age: 73
End: 2023-11-22
Payer: MEDICARE

## 2023-11-22 VITALS
DIASTOLIC BLOOD PRESSURE: 78 MMHG | HEIGHT: 61 IN | SYSTOLIC BLOOD PRESSURE: 170 MMHG | HEART RATE: 63 BPM | OXYGEN SATURATION: 96 %

## 2023-11-22 DIAGNOSIS — I34.0 NONRHEUMATIC MITRAL (VALVE) INSUFFICIENCY: ICD-10-CM

## 2023-11-22 PROCEDURE — 93000 ELECTROCARDIOGRAM COMPLETE: CPT

## 2023-11-22 PROCEDURE — 99215 OFFICE O/P EST HI 40 MIN: CPT | Mod: 25

## 2023-11-22 RX ORDER — METOPROLOL SUCCINATE 100 MG/1
100 TABLET, EXTENDED RELEASE ORAL
Qty: 90 | Refills: 3 | Status: ACTIVE | COMMUNITY
Start: 2017-06-01 | End: 1900-01-01

## 2023-11-22 RX ORDER — OLMESARTAN MEDOXOMIL 40 MG/1
40 TABLET, FILM COATED ORAL
Qty: 90 | Refills: 3 | Status: ACTIVE | COMMUNITY
Start: 2020-05-14 | End: 1900-01-01

## 2023-11-28 LAB
ALBUMIN SERPL ELPH-MCNC: 5 G/DL
ALP BLD-CCNC: 86 U/L
ALT SERPL-CCNC: 15 U/L
ANION GAP SERPL CALC-SCNC: 13 MMOL/L
AST SERPL-CCNC: 16 U/L
BILIRUB SERPL-MCNC: 0.4 MG/DL
BUN SERPL-MCNC: 15 MG/DL
CALCIUM SERPL-MCNC: 9.9 MG/DL
CHLORIDE SERPL-SCNC: 105 MMOL/L
CHOLEST SERPL-MCNC: 177 MG/DL
CO2 SERPL-SCNC: 28 MMOL/L
CREAT SERPL-MCNC: 0.82 MG/DL
EGFR: 75 ML/MIN/1.73M2
ESTIMATED AVERAGE GLUCOSE: 134 MG/DL
GLUCOSE SERPL-MCNC: 105 MG/DL
HBA1C MFR BLD HPLC: 6.3 %
HCT VFR BLD CALC: 43.7 %
HDLC SERPL-MCNC: 51 MG/DL
HGB BLD-MCNC: 14.2 G/DL
LDLC SERPL CALC-MCNC: 89 MG/DL
MCHC RBC-ENTMCNC: 28.6 PG
MCHC RBC-ENTMCNC: 32.5 GM/DL
MCV RBC AUTO: 87.9 FL
NONHDLC SERPL-MCNC: 126 MG/DL
PLATELET # BLD AUTO: 215 K/UL
POTASSIUM SERPL-SCNC: 4.7 MMOL/L
PROT SERPL-MCNC: 7.3 G/DL
RBC # BLD: 4.97 M/UL
RBC # FLD: 13.9 %
SODIUM SERPL-SCNC: 145 MMOL/L
TRIGL SERPL-MCNC: 217 MG/DL
TSH SERPL-ACNC: 0.83 UIU/ML
WBC # FLD AUTO: 8.67 K/UL

## 2023-12-06 ENCOUNTER — APPOINTMENT (OUTPATIENT)
Dept: INTERNAL MEDICINE | Facility: CLINIC | Age: 73
End: 2023-12-06
Payer: MEDICARE

## 2023-12-06 VITALS
OXYGEN SATURATION: 96 % | SYSTOLIC BLOOD PRESSURE: 154 MMHG | TEMPERATURE: 99 F | BODY MASS INDEX: 33.79 KG/M2 | HEART RATE: 74 BPM | WEIGHT: 179 LBS | HEIGHT: 61 IN | DIASTOLIC BLOOD PRESSURE: 86 MMHG | RESPIRATION RATE: 16 BRPM

## 2023-12-06 VITALS — SYSTOLIC BLOOD PRESSURE: 138 MMHG | DIASTOLIC BLOOD PRESSURE: 80 MMHG

## 2023-12-06 PROCEDURE — 99214 OFFICE O/P EST MOD 30 MIN: CPT

## 2023-12-07 ENCOUNTER — APPOINTMENT (OUTPATIENT)
Dept: RADIOLOGY | Facility: CLINIC | Age: 73
End: 2023-12-07
Payer: MEDICARE

## 2023-12-07 PROCEDURE — 77080 DXA BONE DENSITY AXIAL: CPT

## 2024-03-21 ENCOUNTER — RX RENEWAL (OUTPATIENT)
Age: 74
End: 2024-03-21

## 2024-03-21 RX ORDER — HYDROCHLOROTHIAZIDE 12.5 MG/1
12.5 TABLET ORAL
Qty: 45 | Refills: 2 | Status: ACTIVE | COMMUNITY
Start: 2018-01-16

## 2024-03-21 RX ORDER — ROSUVASTATIN CALCIUM 5 MG/1
5 TABLET, FILM COATED ORAL
Qty: 90 | Refills: 1 | Status: ACTIVE | COMMUNITY
Start: 2019-01-14 | End: 1900-01-01

## 2024-05-22 ENCOUNTER — APPOINTMENT (OUTPATIENT)
Dept: CARDIOLOGY | Facility: CLINIC | Age: 74
End: 2024-05-22
Payer: MEDICARE

## 2024-05-22 VITALS
HEIGHT: 61 IN | DIASTOLIC BLOOD PRESSURE: 85 MMHG | WEIGHT: 175 LBS | SYSTOLIC BLOOD PRESSURE: 143 MMHG | OXYGEN SATURATION: 96 % | HEART RATE: 77 BPM | BODY MASS INDEX: 33.04 KG/M2

## 2024-05-22 DIAGNOSIS — R94.31 ABNORMAL ELECTROCARDIOGRAM [ECG] [EKG]: ICD-10-CM

## 2024-05-22 DIAGNOSIS — I51.7 CARDIOMEGALY: ICD-10-CM

## 2024-05-22 PROCEDURE — 93000 ELECTROCARDIOGRAM COMPLETE: CPT

## 2024-05-22 PROCEDURE — G2211 COMPLEX E/M VISIT ADD ON: CPT

## 2024-05-22 PROCEDURE — 99215 OFFICE O/P EST HI 40 MIN: CPT

## 2024-05-22 RX ORDER — MELOXICAM 7.5 MG/1
7.5 TABLET ORAL
Qty: 60 | Refills: 0 | Status: DISCONTINUED | COMMUNITY
Start: 2022-09-29 | End: 2024-05-22

## 2024-05-22 NOTE — CARDIOLOGY SUMMARY
[de-identified] : 5/22/24 -sinus rhythm at a rate of 77 bpm.  Voltage criteria for LVH.  Leftward axis.  Nonspecific diminished voltage in leads V5-V6.  Nonspecific early R wave transition in lead V2.  Nonspecific ST-T wave abnormalities.

## 2024-05-22 NOTE — DISCUSSION/SUMMARY
[FreeTextEntry1] : Ms. Toro has a history of a dyslipidemia and hypertension. I suspect that the falling episode that she experienced on 8/3/18 actually represented a brief syncopal episode, possibly related to hypotension secondary to thiazide diuretic therapy having been added to her antihypertensive medication regimen. She does not report having experienced any recurrent similar episodes since the dosage of the HCTZ was reduced to 12.5 mg every other day at the time of a follow-up visit here on 8/29/18. She has been stable from a cardiac perspective since her previous visit here on 11/22/2023. Specifically, she does not describe having experienced any signs or symptoms to suggest the development of an anginal syndrome or congestive heart failure. Her cardiac examination today is unremarkable. Her blood pressure reading is moderately elevated today.  Her electrocardiogram today reveals sinus rhythm with a leftward axis, voltage criteria for LVH in lead aVL, nonspecific diminished voltage in leads V5-V6, nonspecific poor R wave progression in leads V4-V6, and nonspecific repolarization abnormalities, essentially unchanged from her previous office tracing, allowing for lead placement variation.  The patient tends to run elevated blood pressure readings at her visits to physicians, however, a 24-hour ambulatory blood pressure monitoring study performed on 1/20/2021 revealed the average reading to be normal.  I have instructed the patient to continue her antihypertensive medications as presently prescribed for the time being, and her blood pressure will be followed.  I have reviewed the findings of blood test report of 11/22/2023 in detail with the patient today, and I have instructed her to continue Crestor 5 mg daily for the time being.  She anticipates having follow-up blood testing performed through the office of Dr. Collins in the near future for reassessment.  The importance of proper dietary habits, weight loss, and regular exercise as tolerated was again emphasized to the patient today.   I have reviewed the findings of the echocardiogram of 4/20/2023 and the carotid artery Doppler study of 4/20/2023 in detail with the patient today.  The patient will be following up with her endocrinologist regarding surveillance of her left thyroid nodule and pre-diabetes.  I have asked the patient to call me if she should have any questions or problems pertaining to these matters, and especially if she should experience suspected recurrence of syncope, or any concerning symptoms. I have otherwise asked her to return to the office for follow-up cardiac evaluation and blood pressure reassessment in 6 months, provided she remains clinically stable in the interim.  Consideration will be given toward arranging follow-up echocardiography and carotid artery Doppler testing at that time for reassessment. [EKG obtained to assist in diagnosis and management of assessed problem(s)] : EKG obtained to assist in diagnosis and management of assessed problem(s)

## 2024-06-07 ENCOUNTER — APPOINTMENT (OUTPATIENT)
Dept: ORTHOPEDIC SURGERY | Facility: CLINIC | Age: 74
End: 2024-06-07

## 2024-06-07 DIAGNOSIS — M17.12 UNILATERAL PRIMARY OSTEOARTHRITIS, LEFT KNEE: ICD-10-CM

## 2024-06-07 DIAGNOSIS — M17.11 UNILATERAL PRIMARY OSTEOARTHRITIS, RIGHT KNEE: ICD-10-CM

## 2024-06-07 PROCEDURE — 20610 DRAIN/INJ JOINT/BURSA W/O US: CPT | Mod: 50

## 2024-06-07 PROCEDURE — 73562 X-RAY EXAM OF KNEE 3: CPT | Mod: 50

## 2024-06-07 PROCEDURE — 99213 OFFICE O/P EST LOW 20 MIN: CPT | Mod: 25

## 2024-06-07 NOTE — HISTORY OF PRESENT ILLNESS
[Gradual] : gradual [Sudden] : sudden [4] : 4 [0] : 0 [Burning] : burning [Shooting] : shooting [Stabbing] : stabbing [Intermittent] : intermittent [Rest] : rest [Retired] : Work status: retired [2] : 2 [Synvisc] : Synvisc [de-identified] : 06/07/24:  Returns today complaining recurrent bilateral knee, right worse than left, pain that began only two days ago.   Has done well after the cortisone injection one year ago and Synvisc series that she finished in August last year.  Is interested in cortisone injections today and maybe doing some physical therapy.   **Allergy to Advil.  Left renal stone.   08/01/23:  Here today for Synvisc #3, both knees.   07/24/23:  Here today for Synvisc injections #2, both knees.    07/17/23:  Patient returns today to begin Synvisc injections, both knees.   06/05/23:  Return visit for a 72 year old female here today for complaint of recurrent bilateral knee pain and known osteoarthritis.  Previous cortisone injections both knees two years ago. had a flare up of her rt knee pain x 4 days ago.  05/20/21: Return visit for a 70 year old female here today with recurrent bilateral knee pain with the left side since March of this year, and her right knee which is not as painful. Previous cortisone injections in November of 2019 which helped. Had a series of Euflexxa injections rt knee in 7/19.  Also complains of lower back pain that began on May 2 and intensified after a colonoscopy on May 4 into May 5 with severe pain that woke her. Was diagnosed with diverticulosis and put on an antibiotic. Is scheduled to see Dr. Newsome and has a history of back pain.  11/21/19 Returns today c/o recurring bilateral knee pain rt > lt x last few weeks duration. Finished Euflexxa injections rt knee x 4 months ago which helped. Would like cortisone injections.  8/27/19 returns today still c/o rt knee pain although better since finishing Euflexxa injections.Takes Tylenol XS prn.Having recurring LBP and bilateral leg pain. Scheduled to see pain management.Still in PT for both knee and lower back.  7/16/19: Pt presents for Euflexxa Injection #3 right knee. Still in pain. Has been icing.  7/9/19 Here for Euflexxa injection #2 rt knee.  7/2/19 F/U R knee pain reoccurring. Cortisone injection 5/9/19 helped temporarily. Did visco 9 years ago w/ relief. Pain worse with walking, standing, sitting, sleeping.   5/28/19 Here for MRI rt knee results.feeling 70% better after cortisone injection x 3 weeks ago. In PT 1-2x/week.  5/9/19 Returns today c/o persistent rt knee pain. Had no relief from last injection.  3/5/19 return visit for this 67 yo female c/o spon. onset of rt knee pain x last3 days duration. Constant and daily. Slight limp. Taking Tylenol XS w/o relief.Worse descending stairs w/ both knees.Cannot take nsaids due to facial swelling?? PMH; 7/2010 :s/p arthroscopic menisectomy rt knee and gel injections postop.Did well until now. [] : Post Surgical Visit: no [FreeTextEntry1] : bilateral knees [de-identified] : August 2023 [de-identified] : dr marrero [de-identified] : x-rays [de-identified] : Tylenol, ice

## 2024-06-07 NOTE — PHYSICAL EXAM
[Normal Mood and Affect] : normal mood and affect [Able to Communicate] : able to communicate [Well Developed] : well developed [Well Nourished] : well nourished [5___] : hamstring 5[unfilled]/5 [NL (0)] : extension 0 degrees [Right] : right knee [advanced tricompartmental OA with medial compartment narrowing and varus alignment] : advanced tricompartmental OA with medial compartment narrowing and varus alignment [Left] : left knee [AP] : anteroposterior [Lateral] : lateral [[place]] : skyline [Moderate tricompartmental OA medial narrowing] : Moderate tricompartmental OA medial narrowing [] : non-antalgic [FreeTextEntry9] : Moderate medial joint space narrowing on left [TWNoteComboBox7] : flexion 130 degrees

## 2024-06-07 NOTE — PLAN
[TextEntry] : The natural progression of osteoarthritis was explained to the patient.  We discussed the possible treatment options from conservative to operative.  These included NSAIDs, Glucosamine and Chondroitin sulfate, and physical therapy as well different types of injections.  We also discussed that at some point they may progress to need a TKA.  Information and pamphlets were given.  Medication was injected into the above treated area. After verbal consent using sterile preparation and technique. The risks, benefits, and alternatives to cortisone injection were explained in full to the patient. Risks outlined include but are not limited to infection, sepsis, bleeding, scarring, skin discoloration, temporary increase in pain, syncopal episode, failure to resolve symptoms, allergic reaction, symptom recurrence, and elevation of blood sugar in diabetics. Patient understood the risks. All questions were answered. After discussion of options, patient requested an injection. Oral informed consent was obtained and sterile prep was done of the injection site. Sterile technique was utilized for the procedure including the preparation of the solutions used for the injection. Patient tolerated the procedure well. Advised to ice the injection site this evening. Prep with Betadine locally to site. Sterile technique used. Patient tolerated procedure well. Post Procedure Instructions: Patient was advised to call if redness, pain, or fever occur and apply ice for 15 min. out of every hour for the next 12-24 hours as tolerated.  The patient was instructed on the importance of ice and elevation of the extremity to decrease swelling and pain.  Patient is being referred for physical therapy for various modalities.  If no improvement consider gel injections.

## 2024-06-18 ENCOUNTER — APPOINTMENT (OUTPATIENT)
Dept: INTERNAL MEDICINE | Facility: CLINIC | Age: 74
End: 2024-06-18
Payer: MEDICARE

## 2024-06-18 VITALS
WEIGHT: 170 LBS | RESPIRATION RATE: 14 BRPM | BODY MASS INDEX: 32.1 KG/M2 | HEIGHT: 61 IN | SYSTOLIC BLOOD PRESSURE: 126 MMHG | DIASTOLIC BLOOD PRESSURE: 76 MMHG | HEART RATE: 58 BPM | OXYGEN SATURATION: 97 %

## 2024-06-18 DIAGNOSIS — Z00.00 ENCOUNTER FOR GENERAL ADULT MEDICAL EXAMINATION W/OUT ABNORMAL FINDINGS: ICD-10-CM

## 2024-06-18 DIAGNOSIS — I10 ESSENTIAL (PRIMARY) HYPERTENSION: ICD-10-CM

## 2024-06-18 DIAGNOSIS — E03.9 HYPOTHYROIDISM, UNSPECIFIED: ICD-10-CM

## 2024-06-18 DIAGNOSIS — E78.5 HYPERLIPIDEMIA, UNSPECIFIED: ICD-10-CM

## 2024-06-18 PROCEDURE — G0439: CPT

## 2024-06-18 NOTE — HEALTH RISK ASSESSMENT
[Good] : ~his/her~  mood as  good [0] : 2) Feeling down, depressed, or hopeless: Not at all (0) [FMU2Qoxin] : 0 [Patient reported mammogram was normal] : Patient reported mammogram was normal [MammogramDate] : 02/24 [ColonoscopyDate] : 2021

## 2024-06-18 NOTE — ASSESSMENT
[FreeTextEntry1] : HTN- good control. Continue metoprolol ER 100mg and HCT 12.5mg daily and Olmesartan 40mg daily  Hypothyroidism- followed by [facility].

## 2024-06-19 LAB
25(OH)D3 SERPL-MCNC: 46.9 NG/ML
ALBUMIN SERPL ELPH-MCNC: 4.8 G/DL
ALP BLD-CCNC: 85 U/L
ALT SERPL-CCNC: 19 U/L
ANION GAP SERPL CALC-SCNC: 19 MMOL/L
APPEARANCE: CLEAR
AST SERPL-CCNC: 17 U/L
BACTERIA: NEGATIVE /HPF
BASOPHILS # BLD AUTO: 0.07 K/UL
BASOPHILS NFR BLD AUTO: 0.8 %
BILIRUB SERPL-MCNC: 0.4 MG/DL
BILIRUBIN URINE: NEGATIVE
BLOOD URINE: ABNORMAL
BUN SERPL-MCNC: 26 MG/DL
CALCIUM SERPL-MCNC: 9.7 MG/DL
CAST: 0 /LPF
CHLORIDE SERPL-SCNC: 102 MMOL/L
CHOLEST SERPL-MCNC: 205 MG/DL
CO2 SERPL-SCNC: 23 MMOL/L
COLOR: YELLOW
CREAT SERPL-MCNC: 0.84 MG/DL
EGFR: 73 ML/MIN/1.73M2
EOSINOPHIL # BLD AUTO: 0.07 K/UL
EOSINOPHIL NFR BLD AUTO: 0.8 %
EPITHELIAL CELLS: 3 /HPF
ESTIMATED AVERAGE GLUCOSE: 131 MG/DL
FERRITIN SERPL-MCNC: 250 NG/ML
FOLATE SERPL-MCNC: >20 NG/ML
GLUCOSE QUALITATIVE U: NEGATIVE MG/DL
GLUCOSE SERPL-MCNC: 96 MG/DL
HBA1C MFR BLD HPLC: 6.2 %
HCT VFR BLD CALC: 46.3 %
HDLC SERPL-MCNC: 64 MG/DL
HGB BLD-MCNC: 14.3 G/DL
IMM GRANULOCYTES NFR BLD AUTO: 0.3 %
KETONES URINE: NEGATIVE MG/DL
LDLC SERPL CALC-MCNC: 110 MG/DL
LEUKOCYTE ESTERASE URINE: ABNORMAL
LYMPHOCYTES # BLD AUTO: 3.19 K/UL
LYMPHOCYTES NFR BLD AUTO: 36.9 %
MAN DIFF?: NORMAL
MCHC RBC-ENTMCNC: 28.1 PG
MCHC RBC-ENTMCNC: 30.9 GM/DL
MCV RBC AUTO: 91 FL
MICROSCOPIC-UA: NORMAL
MONOCYTES # BLD AUTO: 0.56 K/UL
MONOCYTES NFR BLD AUTO: 6.5 %
NEUTROPHILS # BLD AUTO: 4.73 K/UL
NEUTROPHILS NFR BLD AUTO: 54.7 %
NITRITE URINE: NEGATIVE
NONHDLC SERPL-MCNC: 142 MG/DL
PH URINE: 5
PLATELET # BLD AUTO: 267 K/UL
POTASSIUM SERPL-SCNC: 4.6 MMOL/L
PROT SERPL-MCNC: 7.5 G/DL
PROTEIN URINE: NORMAL MG/DL
RBC # BLD: 5.09 M/UL
RBC # FLD: 14.6 %
RED BLOOD CELLS URINE: 9 /HPF
SODIUM SERPL-SCNC: 144 MMOL/L
SPECIFIC GRAVITY URINE: >1.03
TRIGL SERPL-MCNC: 181 MG/DL
UROBILINOGEN URINE: 0.2 MG/DL
VIT B12 SERPL-MCNC: 832 PG/ML
WBC # FLD AUTO: 8.65 K/UL
WHITE BLOOD CELLS URINE: 1 /HPF

## 2024-09-04 ENCOUNTER — APPOINTMENT (OUTPATIENT)
Dept: RADIOLOGY | Facility: CLINIC | Age: 74
End: 2024-09-04
Payer: MEDICARE

## 2024-09-04 ENCOUNTER — APPOINTMENT (OUTPATIENT)
Dept: ULTRASOUND IMAGING | Facility: CLINIC | Age: 74
End: 2024-09-04
Payer: MEDICARE

## 2024-09-04 PROCEDURE — 74018 RADEX ABDOMEN 1 VIEW: CPT

## 2024-09-04 PROCEDURE — 76775 US EXAM ABDO BACK WALL LIM: CPT

## 2024-11-14 ENCOUNTER — APPOINTMENT (OUTPATIENT)
Dept: INTERNAL MEDICINE | Facility: CLINIC | Age: 74
End: 2024-11-14
Payer: MEDICARE

## 2024-11-14 VITALS — DIASTOLIC BLOOD PRESSURE: 80 MMHG | SYSTOLIC BLOOD PRESSURE: 120 MMHG

## 2024-11-14 VITALS
WEIGHT: 170 LBS | RESPIRATION RATE: 14 BRPM | TEMPERATURE: 98.7 F | OXYGEN SATURATION: 99 % | DIASTOLIC BLOOD PRESSURE: 90 MMHG | HEART RATE: 77 BPM | BODY MASS INDEX: 32.1 KG/M2 | HEIGHT: 61 IN | SYSTOLIC BLOOD PRESSURE: 148 MMHG

## 2024-11-14 DIAGNOSIS — E03.9 HYPOTHYROIDISM, UNSPECIFIED: ICD-10-CM

## 2024-11-14 DIAGNOSIS — R03.0 ELEVATED BLOOD-PRESSURE READING, W/OUT DIAGNOSIS OF HYPERTENSION: ICD-10-CM

## 2024-11-14 PROCEDURE — 99214 OFFICE O/P EST MOD 30 MIN: CPT

## 2024-11-14 PROCEDURE — G2211 COMPLEX E/M VISIT ADD ON: CPT

## 2024-11-15 LAB
ALBUMIN SERPL ELPH-MCNC: 4.7 G/DL
ALP BLD-CCNC: 82 U/L
ALT SERPL-CCNC: 17 U/L
ANION GAP SERPL CALC-SCNC: 13 MMOL/L
AST SERPL-CCNC: 16 U/L
BILIRUB SERPL-MCNC: 0.4 MG/DL
BUN SERPL-MCNC: 14 MG/DL
CALCIUM SERPL-MCNC: 10.3 MG/DL
CHLORIDE SERPL-SCNC: 100 MMOL/L
CHOLEST SERPL-MCNC: 184 MG/DL
CO2 SERPL-SCNC: 26 MMOL/L
CREAT SERPL-MCNC: 0.84 MG/DL
EGFR: 73 ML/MIN/1.73M2
ESTIMATED AVERAGE GLUCOSE: 123 MG/DL
GLUCOSE SERPL-MCNC: 105 MG/DL
HBA1C MFR BLD HPLC: 5.9 %
HDLC SERPL-MCNC: 47 MG/DL
LDLC SERPL CALC-MCNC: 88 MG/DL
NONHDLC SERPL-MCNC: 136 MG/DL
POTASSIUM SERPL-SCNC: 4.4 MMOL/L
PROT SERPL-MCNC: 7.3 G/DL
SODIUM SERPL-SCNC: 140 MMOL/L
TRIGL SERPL-MCNC: 295 MG/DL

## 2024-11-20 ENCOUNTER — APPOINTMENT (OUTPATIENT)
Dept: CARDIOLOGY | Facility: CLINIC | Age: 74
End: 2024-11-20
Payer: MEDICARE

## 2024-11-20 VITALS
WEIGHT: 170 LBS | OXYGEN SATURATION: 97 % | HEART RATE: 62 BPM | BODY MASS INDEX: 32.1 KG/M2 | SYSTOLIC BLOOD PRESSURE: 163 MMHG | DIASTOLIC BLOOD PRESSURE: 86 MMHG | HEIGHT: 61 IN

## 2024-11-20 DIAGNOSIS — I51.7 CARDIOMEGALY: ICD-10-CM

## 2024-11-20 DIAGNOSIS — E78.5 HYPERLIPIDEMIA, UNSPECIFIED: ICD-10-CM

## 2024-11-20 DIAGNOSIS — I10 ESSENTIAL (PRIMARY) HYPERTENSION: ICD-10-CM

## 2024-11-20 DIAGNOSIS — R94.31 ABNORMAL ELECTROCARDIOGRAM [ECG] [EKG]: ICD-10-CM

## 2024-11-20 PROCEDURE — G2211 COMPLEX E/M VISIT ADD ON: CPT

## 2024-11-20 PROCEDURE — 93000 ELECTROCARDIOGRAM COMPLETE: CPT

## 2024-11-20 PROCEDURE — 99215 OFFICE O/P EST HI 40 MIN: CPT

## 2024-11-22 ENCOUNTER — RX RENEWAL (OUTPATIENT)
Age: 74
End: 2024-11-22

## 2024-11-26 ENCOUNTER — LABORATORY RESULT (OUTPATIENT)
Age: 74
End: 2024-11-26

## 2024-11-26 ENCOUNTER — APPOINTMENT (OUTPATIENT)
Dept: RHEUMATOLOGY | Facility: CLINIC | Age: 74
End: 2024-11-26
Payer: MEDICARE

## 2024-11-26 VITALS
BODY MASS INDEX: 32.1 KG/M2 | OXYGEN SATURATION: 97 % | DIASTOLIC BLOOD PRESSURE: 82 MMHG | HEART RATE: 75 BPM | WEIGHT: 170 LBS | HEIGHT: 61 IN | SYSTOLIC BLOOD PRESSURE: 151 MMHG

## 2024-11-26 VITALS
BODY MASS INDEX: 32.1 KG/M2 | SYSTOLIC BLOOD PRESSURE: 151 MMHG | WEIGHT: 170 LBS | DIASTOLIC BLOOD PRESSURE: 82 MMHG | HEIGHT: 61 IN | OXYGEN SATURATION: 97 % | HEART RATE: 75 BPM

## 2024-11-26 DIAGNOSIS — M25.562 PAIN IN RIGHT KNEE: ICD-10-CM

## 2024-11-26 DIAGNOSIS — M25.561 PAIN IN RIGHT KNEE: ICD-10-CM

## 2024-11-26 DIAGNOSIS — R76.8 OTHER SPECIFIED ABNORMAL IMMUNOLOGICAL FINDINGS IN SERUM: ICD-10-CM

## 2024-11-26 PROCEDURE — 99205 OFFICE O/P NEW HI 60 MIN: CPT | Mod: 25

## 2024-11-26 PROCEDURE — 20610 DRAIN/INJ JOINT/BURSA W/O US: CPT | Mod: 50

## 2024-11-26 RX ORDER — TRIAMCINOLONE ACETONIDE 40 MG/ML
40 SUSPENSION INTRA-ARTERIAL; INTRAMUSCULAR
Refills: 0 | Status: COMPLETED | OUTPATIENT
Start: 2024-11-26

## 2024-11-26 RX ADMIN — TRIAMCINOLONE ACETONIDE 0 MG/ML: 40 INJECTION, SUSPENSION INTRA-ARTICULAR; INTRAMUSCULAR at 00:00

## 2024-11-27 LAB
APPEARANCE: CLEAR
B2 GLYCOPROT1 IGA SERPL IA-ACNC: >65 U/ML
B2 GLYCOPROT1 IGG SER-ACNC: >112 U/ML
B2 GLYCOPROT1 IGM SER-ACNC: 90.9 U/ML
BASOPHILS # BLD AUTO: 0.06 K/UL
BASOPHILS NFR BLD AUTO: 0.8 %
BILIRUBIN URINE: NEGATIVE
BLOOD URINE: ABNORMAL
C3 SERPL-MCNC: 159 MG/DL
C4 SERPL-MCNC: 24 MG/DL
CARDIOLIPIN IGM SER-MCNC: 76.5 MPL U/ML
CARDIOLIPIN IGM SER-MCNC: >112 GPL U/ML
CCP AB SER IA-ACNC: <8 U/ML
CENTROMERE IGG SER-ACNC: <0.2 AL
COLOR: YELLOW
CRP SERPL-MCNC: <3 MG/L
DEPRECATED CARDIOLIPIN IGA SER: >65 APL U/ML
DSDNA AB SER-ACNC: 11 IU/ML
ENA RNP AB SER IA-ACNC: 0.5 AL
ENA SCL70 IGG SER IA-ACNC: <0.2 AL
ENA SM AB SER IA-ACNC: <0.2 AL
ENA SS-A AB SER IA-ACNC: <0.2 AL
ENA SS-B AB SER IA-ACNC: <0.2 AL
EOSINOPHIL # BLD AUTO: 0.13 K/UL
EOSINOPHIL NFR BLD AUTO: 1.8 %
ERYTHROCYTE [SEDIMENTATION RATE] IN BLOOD BY WESTERGREN METHOD: 21 MM/HR
GLUCOSE QUALITATIVE U: NEGATIVE MG/DL
HCT VFR BLD CALC: 41.4 %
HGB BLD-MCNC: 13.7 G/DL
IMM GRANULOCYTES NFR BLD AUTO: 0.3 %
KETONES URINE: NEGATIVE MG/DL
LEUKOCYTE ESTERASE URINE: NEGATIVE
LYMPHOCYTES # BLD AUTO: 2.98 K/UL
LYMPHOCYTES NFR BLD AUTO: 40.5 %
MAN DIFF?: NORMAL
MCHC RBC-ENTMCNC: 28.7 PG
MCHC RBC-ENTMCNC: 33.1 G/DL
MCV RBC AUTO: 86.8 FL
MONOCYTES # BLD AUTO: 0.57 K/UL
MONOCYTES NFR BLD AUTO: 7.7 %
NEUTROPHILS # BLD AUTO: 3.6 K/UL
NEUTROPHILS NFR BLD AUTO: 48.9 %
NITRITE URINE: NEGATIVE
PH URINE: 5.5
PLATELET # BLD AUTO: 249 K/UL
PROTEIN URINE: NEGATIVE MG/DL
RBC # BLD: 4.77 M/UL
RBC # FLD: 13.9 %
RF+CCP IGG SER-IMP: NEGATIVE
RHEUMATOID FACT SER QL: <10 IU/ML
SPECIFIC GRAVITY URINE: 1.02
THYROGLOB AB SERPL-ACNC: 22.9 IU/ML
THYROPEROXIDASE AB SERPL IA-ACNC: 41.5 IU/ML
UROBILINOGEN URINE: 0.2 MG/DL
WBC # FLD AUTO: 7.36 K/UL

## 2024-11-29 LAB
ALBUMIN MFR SERPL ELPH: 61.3 %
ALBUMIN SERPL-MCNC: 4.3 G/DL
ALBUMIN/GLOB SERPL: 1.6 RATIO
ALPHA1 GLOB MFR SERPL ELPH: 4.1 %
ALPHA1 GLOB SERPL ELPH-MCNC: 0.3 G/DL
ALPHA2 GLOB MFR SERPL ELPH: 10.6 %
ALPHA2 GLOB SERPL ELPH-MCNC: 0.7 G/DL
B-GLOBULIN MFR SERPL ELPH: 11.7 %
B-GLOBULIN SERPL ELPH-MCNC: 0.8 G/DL
GAMMA GLOB FLD ELPH-MCNC: 0.9 G/DL
GAMMA GLOB MFR SERPL ELPH: 12.3 %
INTERPRETATION SERPL IEP-IMP: NORMAL
M PROTEIN MFR SERPL ELPH: NORMAL
M PROTEIN SPEC IFE-MCNC: NORMAL
MONOCLON BAND OBS SERPL: NORMAL
PROT SERPL-MCNC: 7 G/DL
PROT SERPL-MCNC: 7 G/DL

## 2024-12-02 LAB
ANA PAT FLD IF-IMP: ABNORMAL
ANA SER IF-ACNC: ABNORMAL
G6PD SER-CCNC: 14.2 U/G HGB

## 2025-02-19 ENCOUNTER — RX RENEWAL (OUTPATIENT)
Age: 75
End: 2025-02-19

## 2025-02-25 ENCOUNTER — LABORATORY RESULT (OUTPATIENT)
Age: 75
End: 2025-02-25

## 2025-02-25 ENCOUNTER — APPOINTMENT (OUTPATIENT)
Dept: RHEUMATOLOGY | Facility: CLINIC | Age: 75
End: 2025-02-25
Payer: MEDICARE

## 2025-02-25 DIAGNOSIS — R76.0 RAISED ANTIBODY TITER: ICD-10-CM

## 2025-02-25 DIAGNOSIS — M32.8 OTHER FORMS OF SYSTEMIC LUPUS ERYTHEMATOSUS: ICD-10-CM

## 2025-02-25 PROCEDURE — 99214 OFFICE O/P EST MOD 30 MIN: CPT

## 2025-02-25 RX ORDER — HYDROXYCHLOROQUINE SULFATE 200 MG/1
200 TABLET, FILM COATED ORAL
Qty: 90 | Refills: 0 | Status: ACTIVE | COMMUNITY
Start: 2025-02-25 | End: 1900-01-01

## 2025-02-26 LAB
ALBUMIN SERPL ELPH-MCNC: 4.4 G/DL
ALP BLD-CCNC: 99 U/L
ALT SERPL-CCNC: 16 U/L
ANION GAP SERPL CALC-SCNC: 13 MMOL/L
APPEARANCE: CLEAR
AST SERPL-CCNC: 17 U/L
BASOPHILS # BLD AUTO: 0.04 K/UL
BASOPHILS NFR BLD AUTO: 0.5 %
BILIRUB SERPL-MCNC: 0.2 MG/DL
BILIRUBIN URINE: NEGATIVE
BLOOD URINE: ABNORMAL
BUN SERPL-MCNC: 15 MG/DL
C3 SERPL-MCNC: 181 MG/DL
C4 SERPL-MCNC: 26 MG/DL
CALCIUM SERPL-MCNC: 10.1 MG/DL
CHLORIDE SERPL-SCNC: 105 MMOL/L
CO2 SERPL-SCNC: 26 MMOL/L
COLOR: YELLOW
CREAT SERPL-MCNC: 0.72 MG/DL
CRP SERPL-MCNC: <3 MG/L
EGFR: 88 ML/MIN/1.73M2
EOSINOPHIL # BLD AUTO: 0.07 K/UL
EOSINOPHIL NFR BLD AUTO: 0.9 %
ERYTHROCYTE [SEDIMENTATION RATE] IN BLOOD BY WESTERGREN METHOD: 24 MM/HR
GLUCOSE QUALITATIVE U: NEGATIVE MG/DL
GLUCOSE SERPL-MCNC: 101 MG/DL
HCT VFR BLD CALC: 41.9 %
HGB BLD-MCNC: 13.3 G/DL
IMM GRANULOCYTES NFR BLD AUTO: 0.1 %
KETONES URINE: NEGATIVE MG/DL
LEUKOCYTE ESTERASE URINE: ABNORMAL
LYMPHOCYTES # BLD AUTO: 2.61 K/UL
LYMPHOCYTES NFR BLD AUTO: 33.6 %
MAN DIFF?: NORMAL
MCHC RBC-ENTMCNC: 28.5 PG
MCHC RBC-ENTMCNC: 31.7 G/DL
MCV RBC AUTO: 89.7 FL
MONOCYTES # BLD AUTO: 0.68 K/UL
MONOCYTES NFR BLD AUTO: 8.8 %
NEUTROPHILS # BLD AUTO: 4.35 K/UL
NEUTROPHILS NFR BLD AUTO: 56.1 %
NITRITE URINE: NEGATIVE
PH URINE: 6.5
PLATELET # BLD AUTO: 266 K/UL
POTASSIUM SERPL-SCNC: 4.2 MMOL/L
PROT SERPL-MCNC: 6.9 G/DL
PROTEIN URINE: NEGATIVE MG/DL
RBC # BLD: 4.67 M/UL
RBC # FLD: 14.6 %
SODIUM SERPL-SCNC: 144 MMOL/L
SPECIFIC GRAVITY URINE: 1.01
UROBILINOGEN URINE: 0.2 MG/DL
WBC # FLD AUTO: 7.76 K/UL

## 2025-02-28 LAB — DSDNA AB SER-ACNC: 7 IU/ML

## 2025-05-27 ENCOUNTER — LABORATORY RESULT (OUTPATIENT)
Age: 75
End: 2025-05-27

## 2025-05-27 ENCOUNTER — APPOINTMENT (OUTPATIENT)
Dept: RHEUMATOLOGY | Facility: CLINIC | Age: 75
End: 2025-05-27
Payer: MEDICARE

## 2025-05-27 DIAGNOSIS — M32.19 OTHER ORGAN OR SYSTEM INVOLVEMENT IN SYSTEMIC LUPUS ERYTHEMATOSUS: ICD-10-CM

## 2025-05-27 PROCEDURE — 99214 OFFICE O/P EST MOD 30 MIN: CPT | Mod: 25

## 2025-05-27 PROCEDURE — 20610 DRAIN/INJ JOINT/BURSA W/O US: CPT | Mod: 50

## 2025-05-27 RX ORDER — TRIAMCINOLONE ACETONIDE 40 MG/ML
40 SUSPENSION INTRA-ARTERIAL; INTRAMUSCULAR
Refills: 0 | Status: COMPLETED | OUTPATIENT
Start: 2025-05-27

## 2025-05-27 RX ADMIN — TRIAMCINOLONE ACETONIDE 0 MG/ML: 40 INJECTION, SUSPENSION INTRA-ARTICULAR; INTRAMUSCULAR at 00:00

## 2025-05-28 ENCOUNTER — NON-APPOINTMENT (OUTPATIENT)
Age: 75
End: 2025-05-28

## 2025-05-28 ENCOUNTER — APPOINTMENT (OUTPATIENT)
Dept: CARDIOLOGY | Facility: CLINIC | Age: 75
End: 2025-05-28
Payer: MEDICARE

## 2025-05-28 VITALS
DIASTOLIC BLOOD PRESSURE: 86 MMHG | HEART RATE: 76 BPM | SYSTOLIC BLOOD PRESSURE: 154 MMHG | HEIGHT: 61 IN | OXYGEN SATURATION: 96 %

## 2025-05-28 DIAGNOSIS — R94.31 ABNORMAL ELECTROCARDIOGRAM [ECG] [EKG]: ICD-10-CM

## 2025-05-28 DIAGNOSIS — E78.5 HYPERLIPIDEMIA, UNSPECIFIED: ICD-10-CM

## 2025-05-28 DIAGNOSIS — I51.7 CARDIOMEGALY: ICD-10-CM

## 2025-05-28 DIAGNOSIS — I10 ESSENTIAL (PRIMARY) HYPERTENSION: ICD-10-CM

## 2025-05-28 LAB
ALBUMIN SERPL ELPH-MCNC: 4.8 G/DL
ALP BLD-CCNC: 83 U/L
ALT SERPL-CCNC: 23 U/L
ANION GAP SERPL CALC-SCNC: 17 MMOL/L
APPEARANCE: CLEAR
AST SERPL-CCNC: 19 U/L
BASOPHILS # BLD AUTO: 0.04 K/UL
BASOPHILS NFR BLD AUTO: 0.5 %
BILIRUB SERPL-MCNC: 0.3 MG/DL
BILIRUBIN URINE: NEGATIVE
BLOOD URINE: ABNORMAL
BUN SERPL-MCNC: 14 MG/DL
C3 SERPL-MCNC: 151 MG/DL
C4 SERPL-MCNC: 24 MG/DL
CALCIUM SERPL-MCNC: 10 MG/DL
CHLORIDE SERPL-SCNC: 102 MMOL/L
CO2 SERPL-SCNC: 23 MMOL/L
COLOR: YELLOW
CREAT SERPL-MCNC: 0.65 MG/DL
CRP SERPL-MCNC: <3 MG/L
DSDNA AB SER-ACNC: 9 IU/ML
EGFRCR SERPLBLD CKD-EPI 2021: 92 ML/MIN/1.73M2
EOSINOPHIL # BLD AUTO: 0.08 K/UL
EOSINOPHIL NFR BLD AUTO: 1.1 %
ERYTHROCYTE [SEDIMENTATION RATE] IN BLOOD BY WESTERGREN METHOD: 18 MM/HR
GLUCOSE QUALITATIVE U: NEGATIVE MG/DL
GLUCOSE SERPL-MCNC: 102 MG/DL
HCT VFR BLD CALC: 45.3 %
HGB BLD-MCNC: 14.1 G/DL
IMM GRANULOCYTES NFR BLD AUTO: 0.5 %
KETONES URINE: NEGATIVE MG/DL
LEUKOCYTE ESTERASE URINE: NEGATIVE
LYMPHOCYTES # BLD AUTO: 2.33 K/UL
LYMPHOCYTES NFR BLD AUTO: 31.7 %
MAN DIFF?: NORMAL
MCHC RBC-ENTMCNC: 27.8 PG
MCHC RBC-ENTMCNC: 31.1 G/DL
MCV RBC AUTO: 89.2 FL
MONOCYTES # BLD AUTO: 0.54 K/UL
MONOCYTES NFR BLD AUTO: 7.4 %
NEUTROPHILS # BLD AUTO: 4.31 K/UL
NEUTROPHILS NFR BLD AUTO: 58.8 %
NITRITE URINE: NEGATIVE
PH URINE: 6.5
PLATELET # BLD AUTO: 281 K/UL
POTASSIUM SERPL-SCNC: 4 MMOL/L
PROT SERPL-MCNC: 7.3 G/DL
PROTEIN URINE: NEGATIVE MG/DL
RBC # BLD: 5.08 M/UL
RBC # FLD: 14.2 %
SODIUM SERPL-SCNC: 141 MMOL/L
SPECIFIC GRAVITY URINE: 1
UROBILINOGEN URINE: 0.2 MG/DL
WBC # FLD AUTO: 7.34 K/UL

## 2025-05-28 PROCEDURE — 99215 OFFICE O/P EST HI 40 MIN: CPT

## 2025-05-28 PROCEDURE — 93000 ELECTROCARDIOGRAM COMPLETE: CPT

## 2025-05-28 PROCEDURE — G2211 COMPLEX E/M VISIT ADD ON: CPT

## 2025-05-29 ENCOUNTER — APPOINTMENT (OUTPATIENT)
Dept: RADIOLOGY | Facility: CLINIC | Age: 75
End: 2025-05-29

## 2025-05-29 PROCEDURE — 73564 X-RAY EXAM KNEE 4 OR MORE: CPT | Mod: 50

## 2025-06-19 ENCOUNTER — APPOINTMENT (OUTPATIENT)
Dept: INTERNAL MEDICINE | Facility: CLINIC | Age: 75
End: 2025-06-19
Payer: MEDICARE

## 2025-06-19 VITALS
HEART RATE: 66 BPM | DIASTOLIC BLOOD PRESSURE: 80 MMHG | WEIGHT: 170 LBS | OXYGEN SATURATION: 98 % | SYSTOLIC BLOOD PRESSURE: 138 MMHG | RESPIRATION RATE: 14 BRPM | BODY MASS INDEX: 32.1 KG/M2 | HEIGHT: 61 IN

## 2025-06-19 PROCEDURE — G0439: CPT

## 2025-06-20 LAB
25(OH)D3 SERPL-MCNC: 42.1 NG/ML
CHOLEST SERPL-MCNC: 207 MG/DL
ESTIMATED AVERAGE GLUCOSE: 134 MG/DL
FOLATE SERPL-MCNC: 17.9 NG/ML
HBA1C MFR BLD HPLC: 6.3 %
HDLC SERPL-MCNC: 63 MG/DL
LDLC SERPL-MCNC: 103 MG/DL
NONHDLC SERPL-MCNC: 144 MG/DL
TRIGL SERPL-MCNC: 243 MG/DL
TSH SERPL-ACNC: 3.76 UIU/ML
VIT B12 SERPL-MCNC: 900 PG/ML

## 2025-07-01 ENCOUNTER — APPOINTMENT (OUTPATIENT)
Dept: INTERNAL MEDICINE | Facility: CLINIC | Age: 75
End: 2025-07-01

## 2025-09-02 ENCOUNTER — APPOINTMENT (OUTPATIENT)
Dept: RHEUMATOLOGY | Facility: CLINIC | Age: 75
End: 2025-09-02
Payer: MEDICARE

## 2025-09-02 VITALS
HEIGHT: 61 IN | DIASTOLIC BLOOD PRESSURE: 82 MMHG | SYSTOLIC BLOOD PRESSURE: 167 MMHG | WEIGHT: 170 LBS | HEART RATE: 82 BPM | OXYGEN SATURATION: 98 % | BODY MASS INDEX: 32.1 KG/M2

## 2025-09-02 DIAGNOSIS — M06.00 RHEUMATOID ARTHRITIS W/OUT RHEUMATOID FACTOR, UNSPECIFIED SITE: ICD-10-CM

## 2025-09-02 DIAGNOSIS — M25.541 PAIN IN JOINTS OF RIGHT HAND: ICD-10-CM

## 2025-09-02 DIAGNOSIS — M25.542 PAIN IN JOINTS OF RIGHT HAND: ICD-10-CM

## 2025-09-02 LAB
BASOPHILS # BLD AUTO: 0.04 K/UL
BASOPHILS NFR BLD AUTO: 0.5 %
EOSINOPHIL # BLD AUTO: 0.05 K/UL
EOSINOPHIL NFR BLD AUTO: 0.6 %
HCT VFR BLD CALC: 45 %
HGB BLD-MCNC: 13.9 G/DL
IMM GRANULOCYTES NFR BLD AUTO: 0.2 %
LYMPHOCYTES # BLD AUTO: 2.28 K/UL
LYMPHOCYTES NFR BLD AUTO: 28.3 %
MAN DIFF?: NORMAL
MCHC RBC-ENTMCNC: 27.8 PG
MCHC RBC-ENTMCNC: 30.9 G/DL
MCV RBC AUTO: 90 FL
MONOCYTES # BLD AUTO: 0.57 K/UL
MONOCYTES NFR BLD AUTO: 7.1 %
NEUTROPHILS # BLD AUTO: 5.09 K/UL
NEUTROPHILS NFR BLD AUTO: 63.3 %
PLATELET # BLD AUTO: 264 K/UL
RBC # BLD: 5 M/UL
RBC # FLD: 14.3 %
WBC # FLD AUTO: 8.05 K/UL

## 2025-09-02 PROCEDURE — 99214 OFFICE O/P EST MOD 30 MIN: CPT

## 2025-09-02 RX ORDER — MELOXICAM 7.5 MG/1
7.5 TABLET ORAL
Qty: 30 | Refills: 0 | Status: ACTIVE | COMMUNITY
Start: 2025-09-02 | End: 1900-01-01

## 2025-09-03 LAB
ALBUMIN SERPL ELPH-MCNC: 4.8 G/DL
ALP BLD-CCNC: 79 U/L
ALT SERPL-CCNC: 18 U/L
ANION GAP SERPL CALC-SCNC: 15 MMOL/L
AST SERPL-CCNC: 18 U/L
BILIRUB SERPL-MCNC: 0.3 MG/DL
BUN SERPL-MCNC: 16 MG/DL
CALCIUM SERPL-MCNC: 10 MG/DL
CHLORIDE SERPL-SCNC: 101 MMOL/L
CO2 SERPL-SCNC: 26 MMOL/L
CREAT SERPL-MCNC: 0.71 MG/DL
CRP SERPL-MCNC: <3 MG/L
EGFRCR SERPLBLD CKD-EPI 2021: 89 ML/MIN/1.73M2
ERYTHROCYTE [SEDIMENTATION RATE] IN BLOOD BY WESTERGREN METHOD: 36 MM/HR
GLUCOSE SERPL-MCNC: 108 MG/DL
POTASSIUM SERPL-SCNC: 4.7 MMOL/L
PROT SERPL-MCNC: 7.4 G/DL
SODIUM SERPL-SCNC: 143 MMOL/L